# Patient Record
Sex: FEMALE
[De-identification: names, ages, dates, MRNs, and addresses within clinical notes are randomized per-mention and may not be internally consistent; named-entity substitution may affect disease eponyms.]

---
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
## 2025-01-24 ENCOUNTER — OFFICE VISIT (OUTPATIENT)
Dept: FAMILY MEDICINE CLINIC | Facility: CLINIC | Age: 20
End: 2025-01-24
Payer: MEDICAID

## 2025-01-24 VITALS
BODY MASS INDEX: 17.97 KG/M2 | WEIGHT: 95.19 LBS | DIASTOLIC BLOOD PRESSURE: 60 MMHG | SYSTOLIC BLOOD PRESSURE: 100 MMHG | HEART RATE: 61 BPM | HEIGHT: 61 IN | RESPIRATION RATE: 18 BRPM | TEMPERATURE: 97 F | OXYGEN SATURATION: 98 %

## 2025-01-24 DIAGNOSIS — E04.2 MULTIPLE THYROID NODULES: ICD-10-CM

## 2025-01-24 DIAGNOSIS — Z13.0 SCREENING FOR ENDOCRINE, NUTRITIONAL, METABOLIC AND IMMUNITY DISORDER: ICD-10-CM

## 2025-01-24 DIAGNOSIS — Z13.29 SCREENING FOR ENDOCRINE, NUTRITIONAL, METABOLIC AND IMMUNITY DISORDER: ICD-10-CM

## 2025-01-24 DIAGNOSIS — Z13.228 SCREENING FOR ENDOCRINE, NUTRITIONAL, METABOLIC AND IMMUNITY DISORDER: ICD-10-CM

## 2025-01-24 DIAGNOSIS — Z23 NEED FOR VACCINATION: ICD-10-CM

## 2025-01-24 DIAGNOSIS — Z00.00 ANNUAL PHYSICAL EXAM: Primary | ICD-10-CM

## 2025-01-24 DIAGNOSIS — R07.89 ATYPICAL CHEST PAIN: ICD-10-CM

## 2025-01-24 DIAGNOSIS — D50.0 IRON DEFICIENCY ANEMIA DUE TO CHRONIC BLOOD LOSS: ICD-10-CM

## 2025-01-24 DIAGNOSIS — K21.9 GASTROESOPHAGEAL REFLUX DISEASE WITHOUT ESOPHAGITIS: ICD-10-CM

## 2025-01-24 DIAGNOSIS — Z13.21 SCREENING FOR ENDOCRINE, NUTRITIONAL, METABOLIC AND IMMUNITY DISORDER: ICD-10-CM

## 2025-01-24 DIAGNOSIS — F41.9 ANXIETY: ICD-10-CM

## 2025-01-24 DIAGNOSIS — E01.0 THYROMEGALY: ICD-10-CM

## 2025-01-24 DIAGNOSIS — N92.6 MISSED PERIOD: ICD-10-CM

## 2025-01-24 DIAGNOSIS — R79.89 LOW TSH LEVEL: ICD-10-CM

## 2025-01-24 DIAGNOSIS — Z11.8 SCREENING FOR CHLAMYDIAL DISEASE: ICD-10-CM

## 2025-01-24 DIAGNOSIS — N94.10 DYSPAREUNIA IN FEMALE: ICD-10-CM

## 2025-01-24 PROCEDURE — 87591 N.GONORRHOEAE DNA AMP PROB: CPT | Performed by: FAMILY MEDICINE

## 2025-01-24 PROCEDURE — 87491 CHLMYD TRACH DNA AMP PROBE: CPT | Performed by: FAMILY MEDICINE

## 2025-01-24 RX ORDER — SUCRALFATE ORAL 1 G/10ML
SUSPENSION ORAL
COMMUNITY
Start: 2024-12-19 | End: 2025-01-24

## 2025-01-24 RX ORDER — SUCRALFATE 1 G/1
1 TABLET ORAL 4 TIMES DAILY
COMMUNITY
Start: 2025-01-07

## 2025-01-24 RX ORDER — OMEPRAZOLE 40 MG/1
CAPSULE, DELAYED RELEASE ORAL
COMMUNITY

## 2025-01-24 NOTE — PATIENT INSTRUCTIONS
Perform labs fasting 8 hours with water or black coffee or or black tea diet  soda only prior to exam.    -Encourage healthy diet of whole food and avoid processed food and sugary drinks and sodas.  Diet should include lean meats and vegetables including 5-7 servings of fruit and vegetables total in 1 day.  Never skip breakfast.  -Encouraged exercise 30 minutes to 60 minutes 3-5 times weekly for 150minutes or more to prevent obesity and chronic disease and eliminate stress and its effect on the body.  -encouraged to continue not smoking or vaping  - recommend condom use per CDC recommendation for all  or unmarried couples  -mammogram order given if 40years old or older  - immunizations-annual flu shot recommended  -Vitamin D3  2000 units daily recommended- buy Over-the-counter  -Recommend 1000mg of calcium daily for osteoporosis prevention discussed. Need to ingest 1000mg of calcium daily to prevent osteoporosis later in life.  I.e. one 8 ounce glass of silk [place] milk has 450 mg of calcium and label states 45%.  Labels list calcium percentages not milligrams.  To calculate milligrams per serving remove the percentage and add a zero (0).  I.e. 9% calcium equals 90 mg  -thin prep pap recommended every 3 years-If previous pap was normal  sooner as directed by your doctor.        Exercise for a Healthier Heart     Exercise with a friend. When activity is fun, you're more likely to stick with it.   You may wonder how you can improve the health of your heart. If you’re thinking about exercise, you’re on the right track. You don’t need to become an athlete, but you do need a certain amount of brisk exercise to help strengthen your heart. If you have been diagnosed with a heart condition, your doctor may recommend exercise to help stabilize your condition. To help make exercise a habit, choose safe, fun activities.  Be sure to check with your healthcare provider before starting an exercise program.   Why  exercise?  Exercising regularly offers many healthy rewards. It can help you do all of the following:  Improve your blood cholesterol level to help prevent further heart trouble  Lower your blood pressure to help prevent a stroke or heart attack  Control diabetes, or reduce your risk of getting this disease  Improve your heart and lung function  Reach and maintain a healthy weight  Make your muscles stronger and more limber so you can stay active  Prevent falls and fractures by slowing the loss of bone mass (osteoporosis)  Manage stress better  Reduce your blood pressure  Improve your sense of self and your body image  Exercise tips  Ease into your routine. Set small goals. Then build on them.  Exercise on most days. Aim for a total of 150 or more minutes of moderate to  vigorous intensity activity each week. Consider 40 minutes, 3 to 4 times a week. For best results, activity should last for 40 minutes on average. It is OK to work up to the 40 minute period over time. Examples of moderate-intensity activity is walking 1 mile in 15 minutes or 30 to 45 minutes of yard work.  Step up your daily activity level. Along with your exercise program, try being more active throughout the day. Walk instead of drive. Do more household tasks or yard work.  Choose one or more activities you enjoy. Walking is one of the easiest things you can do. You can also try swimming, riding a bike, dancing, or taking an exercise class.  Stop exercising and call your doctor if you:  Have chest pain or feel dizzy or lightheaded  Feel burning, tightness, pressure, or heaviness in your chest, neck, shoulders, back, or arms  Have unusual shortness of breath  Have increased joint or muscle pain  Have palpitations or an irregular heartbeat   Date Last Reviewed: 5/1/2016 © 2000-2018 [employer]. [address] [place] [address]. All rights reserved. This information is not intended as a substitute for professional medical  care. Always follow your healthcare professional's instructions.      Eating Heart-Healthy Foods  Eating has a big impact on your heart health. In fact, eating healthier can improve several of your heart risks at once. For instance, it helps you manage weight, cholesterol, and blood pressure. Here are ideas to help you make heart-healthy changes without giving up all the foods and flavors you love.  Getting started  Talk with your healthcare provider about eating plans, such as the DASH or Mediterranean diet. You may also be referred to a dietitian.  Change a few things at a time. Give yourself time to get used to a few eating changes before adding more.  Work to create a tasty, healthy eating plan that you can stick to for the rest of your life.    Goals for healthy eating  Below are some tips to improve your eating habits:  Limit saturated fats and trans fats. Saturated fats raise your levels of cholesterol, so keep these fats to a minimum. They are found in foods such as fatty meats, whole milk, cheese, and palm and coconut oils. Avoid trans fats because they lower good cholesterol as well as raise bad cholesterol. Trans fats are most often found in processed foods.  Reduce sodium (salt) intake. Eating too much salt may increase your blood pressure. Limit your sodium intake to 2,300 milligrams (mg) per day (the amount in 1 teaspoon of salt), or less if your healthcare provider recommends it. Dining out less often and eating fewer processed foods are two great ways to decrease the amount of salt you consume.  Managing calories. A calorie is a unit of energy. Your body burns calories for fuel, but if you eat more calories than your body burns, the extras are stored as fat. Your healthcare provider can help you create a diet plan to manage your calories. This will likely include eating healthier foods as well as exercising regularly. To help you track your progress, keep a diary to record what you eat and how often  you exercise.  Choose the right foods  Aim to make these foods staples of your diet. If you have diabetes, you may have different recommendations than what is listed here:  Fruits and vegetables provide plenty of nutrients without a lot of calories. At meals, fill half your plate with these foods. Split the other half of your plate between whole grains and lean protein.  Whole grains are high in fiber and rich in vitamins and nutrients. Good choices include whole-wheat bread, pasta, and brown rice.  Lean proteins give you nutrition with less fat. Good choices include fish, skinless chicken, and beans.  Low-fat or nonfat dairy provides nutrients without a lot of fat. Try low-fat or nonfat milk, cheese, or yogurt.  Healthy fats can be good for you in small amounts. These are unsaturated fats, such as olive oil, nuts, and fish. Try to have at least 2 servings per week of fatty fish, such as salmon, sardines, mackerel, rainbow trout, and albacore tuna. These contain omega-3 fatty acids, which are good for your heart. Flaxseed is another source of a heart-healthy fat.  More on heart-healthy eating  Read food labels  Healthy eating starts at the grocery store. Be sure to pay attention to food labels on packaged foods. Look for products that are high in fiber and protein, and low in saturated fat, cholesterol, and sodium. Avoid products that contain trans fat. And pay close attention to serving size. For instance, if you plan to eat two servings, double all the numbers on the label.  Prepare food right  A key part of healthy cooking is cutting down on added fat and salt. Look on the internet for lower-fat, lower-sodium recipes. Also, try these tips:  Remove fat from meat and skin from poultry before cooking.  Skim fat from the surface of soups and sauces.  Broil, boil, bake, steam, grill, and microwave food without added fats.  Choose ingredients that spice up your food without adding calories, fat, or sodium. Try these  items: horseradish, hot sauce, lemon, mustard, nonfat salad dressings, and vinegar. For salt-free herbs and spices, try basil, cilantro, cinnamon, pepper, and rosemary.  Date Last Reviewed: 10/1/2017  © 7119-6768 [employer]. [address] [place] [address]. All rights reserved. This information is not intended as a substitute for professional medical care. Always follow your healthcare professional's instructions.       What Is Osteoporosis?  Osteoporosis is a disease that weakens the bones. Weakened bones are more likely to break (fracture). Osteoporosis affects both men and women. But postmenopausal women are most at risk. To help prevent osteoporosis, you need to exercise and nourish your bones throughout your life.    Childhood  The body builds the most bone during these years. That's why boys and girls need foods rich in calcium. They also need plenty of exercise. A healthy diet and exercise helps bones grow strong.  Young adulthood to age 30  During young adulthood, bones become their strongest. This is called peak bone mass. The same good habits that kept bones healthy in childhood help keep bones healthy in adulthood.  Age 30 to menopause  Bone mass declines slightly during these years. Your body makes just enough new bone to maintain peak bone mass. To keep your bones at their peak mass, be sure to exercise and get plenty of calcium.  After menopause  Menopause is when a woman stops having monthly periods. After menopause, the body makes less estrogen (female hormone). This increases bone loss. At this point, treatment may be needed to reduce the risk for fracture. Exercise and calcium can also help keep your bones strong.  Later in life  In later years, both men and women need to take extra care of their bones. By this point, the body loses more bone than it makes. If too much bone is lost, you may be at increased risk for fractures. With age, the quality and quantity of bone  declines. You can lessen bone loss by staying active and increasing your calcium intake. Calcium supplements and other osteoporosis treatments do have risks. So talk with your healthcare provider if you have concerns. If you have osteoporosis, you can also learn ways to increase everyday safety.  Date Last Reviewed: 5/1/2018  © 0270-2135 [employer]. [address] [place] [address], [place] [address]. All rights reserved. This information is not intended as a substitute for professional medical care. Always follow your healthcare professional's instructions.          Preventing Osteoporosis: Meeting Your Calcium Needs    Your body needs calcium to build and repair bones. But it can't make calcium on its own. That's why it's important to eat calcium-rich foods. Some foods are naturally rich in calcium. Others have calcium added (fortified). It's best to get calcium from the foods you eat. But if you can't get enough, you may want to take calcium supplements. To meet your daily calcium needs, try the foods listed below.               Dairy Fish & beans Other sources   Source   Calcium (mg) per serving   Source   Calcium (mg) per serving   Source   Calcium (mg) per serving   Low-fat yogurt, plain   415 mg/8 oz.   Sardines, Atlantic, canned, with bones   351 mg/3 oz.   Oatmeal, instant, fortified   215 mg/1 cup   Nonfat milk   302 mg/1 cup   Ellis, sockeye, canned, with bones   239 mg/3 oz.   Tofu made with calcium sulfate   204 mg/3 oz.   Low-fat milk   297 mg/1 cup   Soybeans, fresh, boiled   131 mg/1/2 cup   Collards   179 mg/1/2 cup   Swiss cheese   272 mg/1 oz.   White beans, cooked   81 mg/1/2 cup   English muffin, whole wheat   175 mg/1 muffin   Cheddar cheese   205 mg/1 oz.   Navy beans, cooked   79 mg/1/2 cup   Kale   90 mg/1/2 cup   Ice cream strawberry   79 mg/1/2 cup           Orange, navel   56 mg/1 medium   Note: Calcium levels may vary depending on brand and size.  Daily calcium needs  14 to 18  years old: 1,300 mg  19 to 30 years old: 1,000 mg  31 to 50 years old: 1,000 mg  51 to 70 years old, women: 1,200 mg  51 to 70 years old, men: 1,000 mg  Pregnant or nursin to 18 years old: 1,300 mg, 19 to 50 years old: 1,000 mg  Older than 70 (women and men): 1,200 mg   Date Last Reviewed: 2018-2018 [employer] [address] [place] [address]. All rights reserved. This information is not intended as a substitute for professional medical care. Always follow your healthcare professional's instructions.          Vitamin D  Does this test have other names?  25-hydroxyvitamin D (25-high-DROX-ee-VIE-tuh-min D), 25(OH)D  What is this test?  Vitamin D is mainly found in fortified dairy foods, juice, breakfast cereal, and certain fish. This vitamin plays many roles in the body. But because it helps the body absorb calcium from foods and supplements, it's particularly important for bone health. Vitamin D has many additional roles in the body.  Vitamin D comes in several forms. When ultraviolet light, such as sunlight, hits your skin, it creates vitamin D3. D2 is used to fortify dairy foods. Both of these are further processed by your liver and kidneys into a form your body can use. Most tests for vitamin D check the level of a form circulating in the body called 25-hydroxyvitamin D, also called 25(OH)D.   Why do I need this test?  You may need this test if your healthcare provider wants to check your vitamin D levels to find out if you have any risks to bone health. These might be:  Low calcium  Soft bones caused by low vitamin D or problems using it (osteomalacia)  Osteopenia  Osteoporosis  Rickets, in children  You may also need this test if you are at risk for low vitamin D levels. Risks include:  Being an older adult  Having difficulty absorbing fat from your diet  Having chronic kidney disease  Have dark skin pigmentation  Being a  baby  Vitamin D has many effects in the  body. You may need this test to help your healthcare provider diagnose or treat:  Problems with the parathyroid gland  Cancer  Autoimmune diseases, such as multiple sclerosis and Crohn's disease  Psoriasis  Asthma  Weakness or falls    What other tests might I have along with this test?  A healthcare provider may also want to check your parathyroid hormone levels and your calcium levels.   What do my test results mean?  Test results may vary depending on your age, gender, health history, the method used for the test, and other things. Your test results may not mean you have a problem. Ask your healthcare provider what your test results mean for you.   Children and adults need more than 30 nanograms per milliliter (ng/ml) of vitamin D. The optimal level of 25(OH)D is usually between 30 and 60 ng/mL. Recommended daily amounts range from 400 to 800 international units (IU) per day based on your age.  Levels lower than normal can mean you are:  Not making enough vitamin D on your own  Not getting enough vitamin D in your diet  Not absorbing vitamin D from your food as you should  Lower levels may also mean that your body is not converting the vitamin as it should. This might be because of kidney or liver disease.  Above-normal levels may be a sign that you're taking too much in supplement form.   How is this test done?  The test is done with a blood sample. A needle is used to draw blood from a vein in your arm or hand.   Does this test pose any risks?  Having a blood test with a needle carries some risks. These include bleeding, infection, bruising, and feeling lightheaded. When the needle pricks your arm or hand, you may feel a slight sting or pain. Afterward, the site may be sore.   What might affect my test results?  The amount of time you spend in the sunlight, your diet, and whether you take vitamin D in supplement form can affect your vitamin D levels. Ask your healthcare provider if any health conditions you  have or medicines you take could affect your results.  How do I get ready for this test?  Tell your healthcare provider if you take vitamin D supplements. Be sure your healthcare provider knows about all medicines, herbs, vitamins, and supplements you are taking. This includes medicines that don't need a prescription and any illicit drugs you may use.   © 7627-7068 [employer]. [address] [place] [address]. All rights reserved. This information is not intended as a substitute for professional medical care. Always follow your healthcare professional's instructions.          Preventing Osteoporosis: Avoiding Bone Loss  Certain factors can speed up bone loss or decrease bone growth. For example, alcohol, cigarettes, and certain medicines reduce bone mass. Some foods make it hard for your body to absorb calcium.    Things to avoid  Here are things to avoid to help prevent osteoporosis:  Alcohol. This is toxic to bones. It is a major cause of bone loss. Heavy drinking can cause osteoporosis even if you have no other risk factors.  Smoking. This reduces bone mass. Smoking may also interfere with estrogen levels and cause early menopause.  Inactivity. Not being active makes your bones lose strength and become thinner. Over time, thin bones may break. Women who aren't active are at a high risk for osteoporosis.  Certain medicines. Some medicines, such as cortisone, increase bone loss. They also decrease bone growth. Ask your healthcare provider about any side effects of your medicines, and how to prevent them.  Protein-rich or salty foods. Eaten in large amounts, these foods may deplete calcium.  Caffeine. This increases calcium loss. People who drink a lot of coffee, tea, or soda lose more calcium than those who don't.  Date Last Reviewed: 5/1/2018  © 7725-0481 [employer]. [address] [place] [address]. All rights reserved. This information is not intended as a  substitute for professional medical care. Always follow your healthcare professional's instructions.          Living with Osteoporosis: Regular Exercise  If you have osteoporosis, exercise is vital for your health. It can prevent bone fractures and spine changes. It will slow bone loss. Exercise will strengthen your body. It can also be fun. A variety of exercises is best. See below for exercises that can help you. But before you start, talk with your healthcare provider to be sure these exercises are right for you.    Resistance exercises. These build muscle strength and maintain bone mass. They also make you less prone to injury. Exercises include lifting small weights, doing push-ups and sit-ups, using elastic exercise bands, and using weight machines.  Weight-bearing activities. These help your whole body. They also help you maintain bone mass. Activities include walking, dancing, and housework.  Non-weight-bearing exercises. These help prevent back strain and pain. They do this by building the trunk and leg muscles. Exercises that help with flexibility can prevent falls. Examples include swimming, water exercise, and stretching.  Staying safe  Here are tips to stay safe:   Always check with your healthcare provider before starting any new exercise program.  Use weights only as instructed.  Stop any exercise that causes pain.   Date Last Reviewed: 5/1/2018  © 3696-6487 [employer]. [address] [place] [address], [place] [address]. All rights reserved. This information is not intended as a substitute for professional medical care. Always follow your healthcare professional's instructions.

## 2025-01-24 NOTE — PROGRESS NOTES
REASON FOR VISIT:    Sandy Hare is a 19 year old female who presents for an Annual Health Assessment.      History of low TSH-repeat TSH was normal negative Hashimoto's or Graves' antibodies.  Ultrasound revealed multinodular thyroid.  Patient has no difficulty swallowing.  Did not follow-up with endocrinologist uncertain if still needs to be seen but would like to have thyroid evaluated.  Denies mood changes but feeling anxious and stressed.  Not interested in psychotherapy or medications.  Denies feeling depressed, sad, hopeless, SI, HI.  Missed 1 menstrual period last month.  Took a home pregnancy test that was negative then had her period 2 weeks ago.  States it was normal.  Has not seen boyfriend in a couple of weeks.      Anxiety-when feeling anxious or stressed can have tightness on the right and left side of her chest that appears to feel better with massage and eventually goes away sometimes can last a couple of hours.  Denies any associated symptoms such as shortness of breath palpitations, dizziness, wheezing, dyspnea on exertion, leg swelling.  Noticed recently intercourse has become painful.  Will have pain in the suprapubic area with penetration.  Denies any vaginal discharge or odor.  Was previously concerned about pregnancy was taking Plan B several times a month and states she has stopped.  Boyfriend is using condoms.    History of anemia-not taking iron regularly.  Can have constipation as a side effect of the anemia.  Will take iron on days she does not consume meat or on her menses to help maintain iron stores.  Denying fatigue.    History of GERD and epigastric pain is due for upper endoscopy with GI in 2 weeks and very anxious about procedure.  Worried about anesthesia.  Has never had surgery.  Denying any melena.      Single, hx of sexually active has long-distance boyfriend  G0  menses: regular, monthly- 5-7days. Did not tolerate the OCP  Birth control: condoms  Last pap: n/a  History  of abnormal pap: n/a  On vit D daily - no   MVI-no  Calcium- cheese, yogurt  Colonoscopy- no  Mammogram- no  Dexa  Exercise - walks dog, and during gym  Diet- standard   Dentist regularly- yes  Annual eye exam  Etoh: 0 drinks per month  Cigs: never, no vaping or no marijuana  Immunizations: needs covid 19 booster  FH significant: reviewed  Family History   Problem Relation Age of Onset    No Known Problems Mother     No Known Problems Father     No Known Problems Sister     Other (autism) Brother     High Cholesterol Maternal Grandmother     Hypertension Maternal Grandfather     Heart Attack Maternal Grandfather 65    Other (lung cancer age 57, smoker) Paternal Grandmother     Cancer Paternal Grandfather         esophageal    Diabetes Paternal Grandfather          Patient Active Problem List   Diagnosis    Episodic tension-type headache, not intractable    Dysmenorrhea in adolescent    Syncope    Situational anxiety    Reading impairment    OCP (oral contraceptive pills) initiation    Acne vulgaris    Dizziness    Seizure-like activity (HCC)    Iron deficiency anemia due to chronic blood loss    On Accutane therapy    Gastritis    History of PID    CANDIDO (generalized anxiety disorder)    Chronic idiopathic constipation    Leukopenia    Anemia    Low TSH level    Subclinical hyperthyroidism    Multiple thyroid nodules    Late menses     Current Outpatient Medications   Medication Sig Dispense Refill    Omeprazole 40 MG Oral Capsule Delayed Release TAKE 1 CAPSULE BY MOUTH DAILY 30 MINUTES BEFORE FOOD OR OTHER MEDS      sucralfate 1 g Oral Tab Take 1 tablet (1 g total) by mouth 4 (four) times daily. BEFORE MEALS & BEDTIME      fluticasone propionate 50 MCG/ACT Nasal Suspension 2 sprays by Each Nare route daily. 16 g 0    Ferrous Sulfate Dried ER (SLOW IRON) 160 (50 Fe) MG Oral Tab CR Take 160 mg by mouth daily as needed. Take 1 tab by mouth on days do not consume meat or during menses 90 tablet 2     Wt Readings  from Last 6 Encounters:   01/24/25 95 lb 3.2 oz (43.2 kg) (1%, Z= -2.25)*   12/30/24 92 lb 9.6 oz (42 kg) (<1%, Z= -2.53)*   10/22/24 103 lb 6.4 oz (46.9 kg) (7%, Z= -1.48)*   07/25/24 96 lb 6.4 oz (43.7 kg) (2%, Z= -2.10)*   05/07/24 96 lb 12.8 oz (43.9 kg) (2%, Z= -2.03)*   04/16/24 95 lb 9.6 oz (43.4 kg) (2%, Z= -2.15)*     * Growth percentiles are based on CDC (Girls, 2-20 Years) data.     Body mass index is 17.99 kg/m².    No results found for: \"GLUCOSE\"  Lab Results   Component Value Date    CHOLEST 131 12/12/2020    CHOLEST 144 09/19/2020    CHOLEST 132 08/22/2020     Lab Results   Component Value Date    HDL 37 (L) 12/12/2020    HDL 31 (L) 09/19/2020    HDL 32 (L) 08/22/2020     No results found for: \"TRIGLY\"  Lab Results   Component Value Date    LDL 75 12/12/2020    LDL 91 09/19/2020    LDL 78 08/22/2020     Lab Results   Component Value Date    AST 17 04/16/2024    AST 14 09/05/2023    AST 10 (L) 12/12/2020     Lab Results   Component Value Date    ALT 9 04/16/2024    ALT 8 09/05/2023    ALT 13 12/12/2020     Lab Results   Component Value Date    TSH 0.995 12/23/2019     Lab Results   Component Value Date    BUN 15 04/16/2024    BUN 13 09/05/2023    BUN 14 12/12/2020    CREATSERUM 0.72 04/16/2024    CREATSERUM 0.74 09/05/2023    CREATSERUM 0.61 12/12/2020       General Health     How would you describe your current health state?: Fair    Type of Diet: Balanced    How do you maintain positive mental well-being?: Visiting Friends;Visiting Family    How would you describe your daily physical activity?: None    If you are a male age 45-79 or a female age 55-79, do you take aspirin?: No    Have you had any immunizations at another office such as Influenza, Hepatitis B, Tetanus, or Pneumococcal?: No    At any time do you feel concerned for the safety/well-being of yourself and/or your children, in your home or elsewhere?: No     CAGE:     Cut: Have you ever felt you should Cut down on your drinking?:  No    Annoyed: Have people Annoyed you by criticizing your drinking?: No    Guilty: Have you ever felt bad or Guilty about your drinking?: No    Eye Opener: Have you ever had a drink first thing in the morning to steady your nerves or to get rid of a hangover (Eye opener)?: No    Scoring  Total Score: 0     Depression Screening (PHQ-2/PHQ-9): Over the LAST 2 WEEKS   Little interest or pleasure in doing things (over the last two weeks)?: Not at all    Feeling down, depressed, or hopeless (over the last two weeks)?: Not at all    PHQ-2 SCORE: 0        PREVENTATIVE SERVICES  INDICATIONS AND SCHEDULE Recommendation Internal Lab or Procedure External Lab or Procedure   Breast Cancer Screening   Every 2 yrs age 50-74 No recommendations at this time    Pap Every 3 yrs age 21-65 or Pap and HPV every 5 yrs age 30-65 No recommendations at this time    Chlamydia Screening Screen Annually age<25, if sex active/on OCPs; >24 high risk CHLAMYDIA TRACHOMATIS$RNA, TMA (no units)   Date Value   04/16/2024 NOT DETECTED       Colonoscopy Screen Every 10 years No recommendations at this time    Flex Sigmoidoscopy Screen  Every 5 years No results found for this or any previous visit.    Fecal Occult Blood  Annually No results found for: \"FOB\", \"OCCULTSTOOL\"    Obesity Screening Screen all adults annually Body mass index is 17.99 kg/m².      Preventive Services for Which Recommendations Vary with Risk Recommendation Internal Lab or Procedure External Lab or Procedure   Cholesterol Screening Recommended screening varies with age, risk and gender LDL Cholesterol (mg/dL)   Date Value   12/12/2020 75       Diabetes Screening  if history of high blood pressure or other  risk factors No results found for: \"A1C\"  GLUCOSE (mg/dL)   Date Value   04/16/2024 73         Gonorrhea Screening if high risk No results found for: \"GONOCOCCUS\"    HIV Screening For all adults age 18-65, older adults at increased risk HIV Antigen Antibody Combo (no units)    Date Value   09/05/2023 NON-REACTIVE       Syphilis Screening Screen if pregnant or high risk No results found for: \"RPR\"    Hepatitis C Screening Screen those at high risk plus screen one time for adults born 1945-1 965 No results found for: \"HCVAB\"    Tuberculosis Screen if high risk No components found for: \"PPDINDURAT\"      Disease Monitoring:    SPECIFIC DISEASE MONITORING Internal Lab or Procedure External Lab or Procedure   Annual Monitoring of Persistent     Medications (ACE/ARB, digoxin, diuretics)    Potassium  Annually POTASSIUM (mmol/L)   Date Value   04/16/2024 4.5         No data to display                Creatinine  Annually CREATININE (mg/dL)   Date Value   04/16/2024 0.72         No data to display                Digoxin Serum Conc  Annually No results found for: \"DIGOXIN\"      No data to display                Diabetes      HgbA1C  Annually No results found for: \"A1C\"      No data to display                Creat/alb ratio  Annually CREATININE (mg/dL)   Date Value   04/16/2024 0.72        LDL  Annually LDL Cholesterol (mg/dL)   Date Value   12/12/2020 75         No data to display                 Dilated Eye exam  Annually      No data to display                   No data to display                Asthma  (Annually between Nov. 1 & Dec. 31)    Date of last AAP/ACT and counseling given on importance of controller meds.                   ALLERGIES:     Allergies   Allergen Reactions    Doxycycline OTHER (SEE COMMENTS)     patient does not was this medication do to expressing headaches and abdomen pain    Metronidazole OTHER (SEE COMMENTS)     patient does not was this medication do to expressing headaches and abdomen pain    Penicillin G RASH       CURRENT MEDICATIONS:   Current Outpatient Medications   Medication Sig Dispense Refill    Omeprazole 40 MG Oral Capsule Delayed Release TAKE 1 CAPSULE BY MOUTH DAILY 30 MINUTES BEFORE FOOD OR OTHER MEDS      sucralfate 1 g Oral Tab Take 1 tablet (1 g  total) by mouth 4 (four) times daily. BEFORE MEALS & BEDTIME      fluticasone propionate 50 MCG/ACT Nasal Suspension 2 sprays by Each Nare route daily. 16 g 0    Ferrous Sulfate Dried ER (SLOW IRON) 160 (50 Fe) MG Oral Tab CR Take 160 mg by mouth daily as needed. Take 1 tab by mouth on days do not consume meat or during menses 90 tablet 2      MEDICAL INFORMATION:   No past medical history on file.   No past surgical history on file.   Family History   Problem Relation Age of Onset    No Known Problems Mother     No Known Problems Father     No Known Problems Sister     Other (autism) Brother     High Cholesterol Maternal Grandmother     Hypertension Maternal Grandfather     Heart Attack Maternal Grandfather 65    Other (lung cancer age 57, smoker) Paternal Grandmother     Cancer Paternal Grandfather         esophageal    Diabetes Paternal Grandfather       SOCIAL HISTORY:   Social History     Socioeconomic History    Marital status: Single   Tobacco Use    Smoking status: Never     Passive exposure: Never    Smokeless tobacco: Never   Vaping Use    Vaping status: Never Used   Substance and Sexual Activity    Alcohol use: Never    Drug use: Never   Other Topics Concern    Caffeine Concern No    Exercise No    Seat Belt No    Special Diet No    Stress Concern No    Weight Concern No     Social Drivers of Health     Food Insecurity: No Food Insecurity (1/24/2025)    NCSS - Food Insecurity     Worried About Running Out of Food in the Last Year: No     Ran Out of Food in the Last Year: No   Transportation Needs: No Transportation Needs (1/24/2025)    NCSS - Transportation     Lack of Transportation: No   Housing Stability: Not At Risk (1/24/2025)    NCSS - Housing/Utilities     Has Housing: Yes     Worried About Losing Housing: No     Unable to Get Utilities: No     Occ:     : single        REVIEW OF SYSTEMS:   GENERAL: feels well otherwise  SKIN: denies any unusual skin lesions  EYES: denies blurred vision or  double vision  HEENT: denies nasal congestion, sinus pain or ST  LUNGS: denies shortness of breath with exertion  CARDIOVASCULAR: denies chest pain on exertion  GI: hx of epigastric pain,  heartburn/GERD  : denies dysuria, vaginal discharge or itching, periods regular   MUSCULOSKELETAL: denies back pain  NEURO: denies headaches  PSYCHE: denies depression. hx of  anxiety  HEMATOLOGIC: hx of anemia  ENDOCRINE: thyroid history-nodules  ALL/ASTHMA: denies hx of allergy or asthma    EXAM:   /60   Pulse 61   Temp 97.4 °F (36.3 °C) (Temporal)   Resp 18   Ht 5' 1\" (1.549 m)   Wt 95 lb 3.2 oz (43.2 kg)   LMP 12/02/2024 (Exact Date)   SpO2 98%   BMI 17.99 kg/m²    Patient's last menstrual period was 12/02/2024 (exact date).   GENERAL: well developed, well nourished, in no apparent distress  SKIN: no rashes, no suspicious lesions  HEENT: atraumatic, normocephalic, ears and throat are clear  EYES:PERRLA, EOMI, normal optic disk, conjunctiva are clear  NECK: supple, no adenopathy, no bruits  CHEST: no chest tenderness  BREAST: deferred  LUNGS: clear to auscultation  CARDIO: RRR without murmur  GI: good BS's, no masses, HSM or tenderness  :  declined  RECTAL: deferred  MUSCULOSKELETAL: back is not tender, FROM of the back  EXTREMITIES: no cyanosis, clubbing or edema  NEURO: Oriented times three, cranial nerves are intact, motor and sensory are grossly intact    ASSESSMENT AND OTHER RELEVANT CHRONIC CONDITIONS:   Sandy Hare is a 19 year old female who presents for an Annual Health Assessment.     PLAN SUMMARY:   1. Annual physical exam  -Encourage Mediterranean diet  -Recommended exercise-  60 minutes daily to prevent obesity and chronic disease and eliminate stress and its effect on the body until age 21.  -encouraged not to continue not smoking or vaping.  No established safety data or long-term effects of marijuana use.  -Osteoporosis prevention-recommend ingesting 1000milligrams daily ingest dairy rich  foods  -safe sex practices-- recommend condom use all couples to help prevent sexually transmitted diseases  -Flu shot recommended annually in fall  -recommended cervical cancer screening/Pap smear starting at age 21  -CBC With Differential With Platelet  - Tetanus-Diphth-Acell Pertussis, Tdap, 5-2.5-18.5 LF-MCG/0.5 Intramuscular Suspension; Inject 0.5 mL into the muscle once for 1 dose. Local pharmacy  Dispense: 0.5 mL; Refill: 0    2. Multiple thyroid nodules  3. Thyromegaly  - TSH W REFLEX TO FREE T4 [37363][Q]  - US THYROID (CPT=76536); Future  No palpable thyroid nodules-mildly enlarged asymptomatic otherwise  No change in voice    4. Low TSH level-history of  - TSH W REFLEX TO FREE T4 [17384][Q]    5. Iron deficiency anemia due to chronic blood loss  - IRON AND TIBC [8328] [Q]  - FERRITIN [457] [Q]  Encouraged iron on the days she does not eat meat and during her menstrual cycle.  May use MiraLAX and fiber Gummies to help manage constipation symptoms  Improve water intake drink 60 to 80 ounces daily  Increase fiber in diet    6. Missed period  - Urine Preg Test- negative    7. Anxiety  - TSH W REFLEX TO FREE T4 [22311][Q]  - Angel Trejo Navigator  Refuses medication  Agrees to psychotherapy and to learn coping skills    8. Atypical chest pain  - TSH W REFLEX TO FREE T4 [93461][Q]  only occurs with when feeling very anxious no other associated symptoms  Seems to improve relaxes or massages both sides of her chest.  No associated palpitations.    9. Dyspareunia in female  - OBG Referral - [facility] ([place])  Denied any abnormal vaginal discharge or odor.  Using condoms.  Will check GC and chlamydia   refuses OCPs  History of dysmenorrhea possible may have endometriosis    10. Gastroesophageal reflux disease without esophagitis  Reassured patient is EGDs are fairly safe with anesthesia only down through IV and will not be ended intubated.  In general EGD procedure itself can take anywhere from 3 to 10 minutes.   It is not a long procedure  Encourage patient to follow-up with GI since not responding to GERD medications.  No melena, dysphagia or vomiting.    11. Need for vaccination  - Tetanus-Diphth-Acell Pertussis, Tdap, 5-2.5-18.5 LF-MCG/0.5 Intramuscular Suspension; Inject 0.5 mL into the muscle once for 1 dose. Local pharmacy  Dispense: 0.5 mL; Refill: 0    13. Screening for endocrine, nutritional, metabolic and immunity disorder  - CBC With Differential With Platelet    14. Screening for chlamydial disease  - Chlamydia/Gc Amplification      The patient indicates understanding of these issues and agrees to the plan.  Return in about 1 year (around 1/24/2026) for annual physical, sooner if needed., if worse or new symptoms.    Diet counseling perfomed  Exercise counseling perfomed  STI Prevention counseling perfomed    SUGGESTED VACCINATIONS - Influenza, Pneumococcal, Zoster, Tetanus     Immunization History   Administered Date(s) Administered    Covid-19 Vaccine Pfizer 30 mcg/0.3 ml 06/12/2021, 07/10/2021    Covid-19 Vaccine Pfizer Mark-Sucrose 30 mcg/0.3 ml 02/25/2022    DTAP 11/16/2005, 11/16/2005, 01/20/2006, 01/20/2006, 04/25/2006, 04/25/2006, 03/12/2007, 03/12/2007, 08/15/2011, 08/15/2011    FLULAVAL 6 months & older 0.5 ml Prefilled syringe (59157) 10/22/2018, 01/20/2020, 11/16/2020, 11/04/2021, 11/08/2022, 09/12/2023    FLUZONE 6 months and older PFS 0.5 ml (78764) 10/22/2018, 01/20/2020, 11/16/2020, 11/04/2021    Fluvirin, 3 Years & >, Im 12/01/2006, 12/15/2008    HEP A,Ped/Adol,(2 Dose) 03/12/2007, 11/26/2007    HEP B, Adult 09/12/2023    HEP B, Ped/Adol 09/12/2005, 10/18/2005, 09/01/2006    HEP B/HIB 09/12/2006    HIB 11/16/2005, 01/20/2006, 09/01/2006    Hib, Unspecified Formulation 11/16/2005, 01/20/2006    Hpv Virus Vaccine 9 María Elena Im 05/11/2018, 11/23/2018    IPV 11/16/2005, 01/20/2006, 04/25/2006, 08/15/2011    Influenza Vaccine, trivalent (IIV3), PF 0.5mL (60923) 10/08/2024    Intranasal (CPT=90660),  Influenza Vaccine, Flu Clinic 10/30/2012    MMR 09/12/2006, 08/15/2011    Meningococcal-Menactra 06/07/2017    Meningococcal-Menveo 10-55 YEARS 11/04/2021    Meningococcal-Menveo 2month-55yr 11/04/2021    Pneumococcal (Prevnar 7) 09/12/2005, 11/16/2005, 01/20/2006, 04/25/2006, 09/12/2006    TDAP 12/25/2015    Varicella 09/12/2006, 08/15/2011       Influenza Annually   Pneumococcal if high risk   Td/Tdap once then every 10 years   HPV Females 11-26: 3 doses   Zoster (Shingles) 60 and older: one dose   Varicella 2 doses if not immune   MMR 1-2 doses if born after 1956 and not immune

## 2025-01-26 PROBLEM — Z30.011 OCP (ORAL CONTRACEPTIVE PILLS) INITIATION: Status: RESOLVED | Noted: 2018-06-22 | Resolved: 2025-01-26

## 2025-01-26 PROBLEM — L70.0 ACNE VULGARIS: Status: RESOLVED | Noted: 2019-05-03 | Resolved: 2025-01-26

## 2025-01-26 PROBLEM — R55 SYNCOPE: Status: RESOLVED | Noted: 2018-05-11 | Resolved: 2025-01-26

## 2025-01-26 PROBLEM — D72.819 LEUKOPENIA: Status: RESOLVED | Noted: 2024-04-23 | Resolved: 2025-01-26

## 2025-01-26 PROBLEM — Z79.899 ON ACCUTANE THERAPY: Status: RESOLVED | Noted: 2020-11-19 | Resolved: 2025-01-26

## 2025-01-26 PROBLEM — R56.9 SEIZURE-LIKE ACTIVITY (HCC): Status: RESOLVED | Noted: 2019-12-17 | Resolved: 2025-01-26

## 2025-01-26 PROBLEM — R42 DIZZINESS: Status: RESOLVED | Noted: 2019-12-17 | Resolved: 2025-01-26

## 2025-01-26 PROBLEM — Z79.2 ON ACCUTANE THERAPY: Status: RESOLVED | Noted: 2020-11-19 | Resolved: 2025-01-26

## 2025-01-26 PROBLEM — G44.219 EPISODIC TENSION-TYPE HEADACHE, NOT INTRACTABLE: Status: RESOLVED | Noted: 2018-05-11 | Resolved: 2025-01-26

## 2025-01-26 PROBLEM — D64.9 ANEMIA: Status: RESOLVED | Noted: 2024-04-23 | Resolved: 2025-01-26

## 2025-01-27 ENCOUNTER — TELEPHONE (OUTPATIENT)
Age: 20
End: 2025-01-27

## 2025-01-27 LAB
C TRACH DNA SPEC QL NAA+PROBE: NEGATIVE
N GONORRHOEA DNA SPEC QL NAA+PROBE: NEGATIVE

## 2025-01-27 NOTE — TELEPHONE ENCOUNTER
[facility]  [address], [address], [place], [address]  Phone: [phone]  [email or web address]     [facility]  [address], [place], [address]  Phone: [phone]  [email or web address]    [facility]  [address], [address], [place], [address]  Phone: [phone]  [email or web address]    [facility]  [address], [address], [place], [address]  Phone: [phone]  [email or web address]    [facility]  [address], [address], [place], [address]  Phone: [phone]  [email or web address]    [facility]  [address] , [address], [place], [address]  Phone: [phone]  [email or web address]

## 2025-01-30 LAB
% SATURATION: 12 % (CALC) (ref 15–45)
ABSOLUTE BASOPHILS: 30 CELLS/UL (ref 0–200)
ABSOLUTE EOSINOPHILS: 200 CELLS/UL (ref 15–500)
ABSOLUTE LYMPHOCYTES: 1684 CELLS/UL (ref 850–3900)
ABSOLUTE MONOCYTES: 303 CELLS/UL (ref 200–950)
ABSOLUTE NEUTROPHILS: 1484 CELLS/UL (ref 1500–7800)
BASOPHILS: 0.8 %
EOSINOPHILS: 5.4 %
FERRITIN: 7 NG/ML (ref 16–154)
HEMATOCRIT: 31.5 % (ref 35–45)
HEMOGLOBIN: 11 G/DL (ref 11.7–15.5)
IRON BINDING CAPACITY: 319 MCG/DL (CALC) (ref 271–448)
IRON, TOTAL: 39 MCG/DL (ref 27–164)
LYMPHOCYTES: 45.5 %
MCH: 33.2 PG (ref 27–33)
MCHC: 34.9 G/DL (ref 32–36)
MCV: 95.2 FL (ref 80–100)
MONOCYTES: 8.2 %
MPV: 13.9 FL (ref 7.5–12.5)
NEUTROPHILS: 40.1 %
PLATELET COUNT: 141 THOUSAND/UL (ref 140–400)
RDW: 14.2 % (ref 11–15)
RED BLOOD CELL COUNT: 3.31 MILLION/UL (ref 3.8–5.1)
T4, FREE: 1 NG/DL (ref 0.8–1.4)
TSH W/REFLEX TO FT4: 0.4 MIU/L
WHITE BLOOD CELL COUNT: 3.7 THOUSAND/UL (ref 3.8–10.8)

## 2025-02-06 ENCOUNTER — TELEMEDICINE (OUTPATIENT)
Dept: FAMILY MEDICINE CLINIC | Facility: CLINIC | Age: 20
End: 2025-02-06
Payer: MEDICAID

## 2025-02-06 DIAGNOSIS — R79.89 LOW TSH LEVEL: Primary | ICD-10-CM

## 2025-02-06 DIAGNOSIS — E05.90 SUBCLINICAL HYPERTHYROIDISM: ICD-10-CM

## 2025-02-06 DIAGNOSIS — N91.2 AMENORRHEA: ICD-10-CM

## 2025-02-06 DIAGNOSIS — F41.1 GAD (GENERALIZED ANXIETY DISORDER): ICD-10-CM

## 2025-02-06 DIAGNOSIS — E04.2 MULTIPLE THYROID NODULES: ICD-10-CM

## 2025-02-06 DIAGNOSIS — D50.0 IRON DEFICIENCY ANEMIA DUE TO CHRONIC BLOOD LOSS: ICD-10-CM

## 2025-02-06 DIAGNOSIS — K59.03 DRUG-INDUCED CONSTIPATION: ICD-10-CM

## 2025-02-06 RX ORDER — ASPIRIN 81 MG
100 TABLET, DELAYED RELEASE (ENTERIC COATED) ORAL 2 TIMES DAILY PRN
Qty: 60 TABLET | Refills: 0 | Status: SHIPPED | OUTPATIENT
Start: 2025-02-06

## 2025-02-06 RX ORDER — OMEGA-3S/DHA/EPA/FISH OIL 1000-1400
2 CAPSULE,DELAYED RELEASE (ENTERIC COATED) ORAL NIGHTLY
Qty: 180 TABLET | Refills: 3 | Status: SHIPPED | OUTPATIENT
Start: 2025-02-06

## 2025-02-06 RX ORDER — DICYCLOMINE HYDROCHLORIDE 10 MG/1
CAPSULE ORAL 4 TIMES DAILY PRN
COMMUNITY
Start: 2025-02-04

## 2025-02-06 RX ORDER — CHOLECALCIFEROL (VITAMIN D3) 10(400)/ML
160 DROPS ORAL DAILY PRN
Qty: 90 TABLET | Refills: 3 | Status: SHIPPED | OUTPATIENT
Start: 2025-02-06

## 2025-02-06 NOTE — PROGRESS NOTES
The patient's office visit was converted to a video visit due to pt preference  Time Spent: 23 minutes    Chief Complaint   Patient presents with    Follow - Up     Discuss lab results      Subjective     HPI:   Sandy Hare is a 19 year old female who presents for discuss labs. Recent repeat labs- History of low TSH-repeat TSH was normal negative Hashimoto's or Graves' antibodies on 5/10/2025. Ultrasound revealed multinodular thyroid. Patient has no difficulty swallowing. Not taking any supplements. Did not follow-up with endocrinologist uncertain if still needs to be seen but would like to have thyroid evaluated. Denies mood changes but feeling anxious and stressed. Not interested in psychotherapy or medications. Denies feeling depressed, sad, hopeless, SI, HI.     Amenorrhea- -LMP 12/3/2025. Took a home pregnancy test that was negative then had her period 2 weeks ago. States it was normal. Has not seen boyfriend in a couple of weeks.  Wants to continue using condoms.  Was using Plan B excessively previously but states has not used it in several months.    LAKESHIA-not taking iron. Off iron x several months. Hx of contstipation drug induced.  Eating high-fiber diet vegetables fruit drinking a lot of water avocado.  States having bowel movements every day.  Does not have any fiber supplements or stool softeners.  Sometimes has dizziness if she goes from sitting to standing but no syncope.  Has history of syncope with moderate iron deficiency anemia in the past.  Denies any palpitations.  No chest pain except for chest burning.    GERD/chest burning-had recent EGD on 1/27/2025 with GI and told all normal.  States will be having a \"acid study\" and is on omeprazole 40 mg but does not feel helping.  Still having the burning in the chest.  States she is waiting for her callback from the GI.  She was told to call the gastroenterologist in 2 days if she was not better on the medication.  She denies any shortness of  breath, worsening dyspnea dizziness, worsening pain.  Burning in the chest symptoms started in December 2024.        Weight 97# now  Wt Readings from Last 6 Encounters:   01/24/25 95 lb 3.2 oz (43.2 kg) (1%, Z= -2.25)*   12/30/24 92 lb 9.6 oz (42 kg) (<1%, Z= -2.53)*   10/22/24 103 lb 6.4 oz (46.9 kg) (7%, Z= -1.48)*   07/25/24 96 lb 6.4 oz (43.7 kg) (2%, Z= -2.10)*   05/07/24 96 lb 12.8 oz (43.9 kg) (2%, Z= -2.03)*   04/16/24 95 lb 9.6 oz (43.4 kg) (2%, Z= -2.15)*     * Growth percentiles are based on [facility] (Girls, 2-20 Years) data.          Chief Complaint Reviewed and Verified  Nursing Notes Reviewed and   Verified  Tobacco Reviewed  Allergies Reviewed  Medications Reviewed    Medical History Reviewed  Surgical History Reviewed  OB Status Reviewed    Family History Reviewed  Social History Reviewed                Medications Ordered Prior to Encounter[1]        Physical Exam:  LMP 12/02/2024 (Exact Date)     alert and cooperative, well-nourished/well-hydrated, no no pallor or tachypnea, appropriately groomed, speaking in full sentences comfortably and Normal work of breathing, answers questions appropriately      Office Visit on 01/24/2025   Component Date Value    WHITE BLOOD CELL COUNT 01/29/2025 3.7 (L)     RED BLOOD CELL COUNT 01/29/2025 3.31 (L)     HEMOGLOBIN 01/29/2025 11.0 (L)     HEMATOCRIT 01/29/2025 31.5 (L)     MCV 01/29/2025 95.2     MCH 01/29/2025 33.2 (H)     MCHC 01/29/2025 34.9     RDW 01/29/2025 14.2     PLATELET COUNT 01/29/2025 141     MPV 01/29/2025 13.9 (H)     ABSOLUTE NEUTROPHILS 01/29/2025 1,484 (L)     ABSOLUTE LYMPHOCYTES 01/29/2025 1,684     ABSOLUTE MONOCYTES 01/29/2025 303     ABSOLUTE EOSINOPHILS 01/29/2025 200     ABSOLUTE BASOPHILS 01/29/2025 30     NEUTROPHILS 01/29/2025 40.1     LYMPHOCYTES 01/29/2025 45.5     MONOCYTES 01/29/2025 8.2     EOSINOPHILS 01/29/2025 5.4     BASOPHILS 01/29/2025 0.8     Chlamydia Trachomatis Am* 01/24/2025 Negative     Neisseria Gonorrhoeae  Am* 01/24/2025 Negative     Chlam/GC Source 01/24/2025 Urine     Pregnancy Test, Urine 01/24/2025 negative     Control Line Present wit* 01/24/2025 yes     Kit Lot # 01/24/2025 [id number]     Kit Expiration Date 01/24/2025 01/06/2026     TSH W/REFLEX TO FT4 01/29/2025 0.40 (L)     T4, FREE 01/29/2025 1.0     IRON, TOTAL 01/29/2025 39     IRON BINDING CAPACITY 01/29/2025 319     % SATURATION 01/29/2025 12 (L)     FERRITIN 01/29/2025 7 (L)    Office Visit on 12/30/2024   Component Date Value    SARS-CoV-2 (COVID-19)- (* 12/30/2024 Not Detected     Influenza A by PCR 12/30/2024 Not Detected     Influenza B by PCR 12/30/2024 Not Detected     RSV by PCR 12/30/2024 Not Detected    Office Visit on 10/22/2024   Component Date Value    Strep Grp A Screen 10/22/2024 positive     Control Line Present wit* 10/22/2024 yes     Kit Lot # 10/22/2024 [id number]     Kit Expiration Date 10/22/2024 11/15/2025      tudy Result    Narrative   PROCEDURE: US THYROID (CPT= 92394)     COMPARISON: None.     INDICATIONS: E05.90 Subclinical hyperthyroidism     TECHNIQUE: High-resolution ultrasound was performed of the thyroid gland.     FINDINGS:  RIGHT LOBE:  Size:   5.3 x 1.8 x 1.1 cm,  5.1 ml.    Echotexture:   Homogeneous with 2 subcentimeter nodules in the mid pole measuring up to 0.3 cm and 0.4 cm respectively both corresponding with TR 4.  Doppler Flow:   Normal          LEFT LOBE:  Size:   4.9 x 1.1 x 1.6 cm,  4.3 ml.    Echotexture:   Homogeneous with a 0.7 cm lower pole nodule corresponding with TR 3.  Doppler Flow:   Normal          ISTHMUS:  0.3 cm AP diameter in the midline.  Normal echogenicity.  No masses.    OTHER:   No masses or adenopathy.                      Impression   CONCLUSION:     Multinodular thyroid as described above.  The bilateral subcentimeter above described thyroid nodules do not meet ACR criteria for follow-up sonography or fine-needle aspiration.        ACR THYROID IMAGING REPORTING AND DATA SYSTEM (TI-RADS)  GUIDELINES     Score Assessment   Recommendation     TR1:  0 points benign   No FNA required  TR2:  2 points not suspicious No FNA required  TR3:  3 points     mildly suspicious Greater than or equal to 1.5cm follow-up, greater than or equal to 2.5cm FNA: follow up: 1, 2, 3 and 5 years.    TR4:  4-6 points   moderately suspicious Greater than or equal to 1.0cm follow-up, greater than or equal to 1.5cm FNA, follow up: 1, 2, 3 and 5 years.    TR5: 7 or more points highly suspicious Greater than or equal to 0.5cm follow up, greater than or equal to 1.0cm FNA: annual follow up for 5 years.               Dictated by (CST): Jayme Flood MD on 5/08/2024 at 4:15 PM      Finalized by (CST): Jayme Flood MD on 5/08/2024 at 4:16 PM                 Assessment    Diagnoses and all orders for this visit:    Low TSH level  Subclinical hyperthyroidism  Multiple thyroid nodules  -     Endocrine Referral - In Network  TSH is still low.  Ultrasound with multinodular goiter x 3 with TR 3 and TR 4 qualifications but less than 1 cm so follow-up her ACR thyroid imaging guidelines no follow-up is indicated.  Recommend endocrinology consult  Patient agrees to schedule    Iron deficiency anemia due to chronic blood loss  -    Restart Ferrous Sulfate Dried ER (SLOW IRON) 160 (50 Fe) MG Oral Tab CR; Take 160 mg by mouth daily as needed. Take iron during menses and on days do not consume meat  -     Start to prevent constipation fiber Adult Gummies 2 g Oral Chew Tab; Chew 2 tablets by mouth at bedtime. Buy over-the-counter.  Refuses to take OCPs  Not interested in IUD  Patient is not vegetarian but can skip a few days a week without eating chicken or meat  Take iron during her menstrual cycle and on the days she does not consume meat  Will try not taking daily due to GI side effects and constipation    Drug-induced constipation  -     Ferrous Sulfate Dried ER (SLOW IRON) 160 (50 Fe) MG Oral Tab CR; Take 160 mg by mouth daily as  needed. Take iron during menses and on days do not consume meat  -     Fiber Adult Gummies 2 g Oral Chew Tab; Chew 2 tablets by mouth at bedtime. Buy over-the-counter.  -     docusate sodium 100 MG Oral Tab; Take 1 tablet (100 mg total) by mouth 2 (two) times daily as needed for constipation. BUY OTC if not covered- compare to \" Colace\" name brand at local pharmacy  Drink adequate amounts of water 70 ounces daily  Encouraged high-fiber diet with multiple fruits and vegetables at least 3-4 servings total daily  Limit starches such as bread, rice and [place] which are constipating    CANDIDO (generalized anxiety disorder)  Declined psychotherapy or medication  Is tends to be very anxious and worries medically about her health    Amenorrhea  Possibly due to thyroid disease reporting negative home pregnancy test and also in GI office recently  Not in office to check urine hCG-but had negative urine hCG 1/24/2025 prior to EGD  Will monitor       Consult ordered.  Prescription medication ordered.  Recommended OTC medications- over-the-counter fiber Gummies  Reinforced healthy diet, lifestyle, and exercise.    Return in about 3 months (around 5/6/2025) for discuss labs, recheck symptoms.    DO Sandy Sanchez understands video or phone evaluation is not a substitute for face-to-face examination or emergency care. Patient advised to go to ER or call 911 for worsening symptoms or acute distress.     Patient/Caregiver Education: Patient/Caregiver Education: There are no barriers to learning. Medical education done.   Outcome: Patient verbalizes understanding. Patient is notified to call with any questions, complications, allergies, or worsening or changing symptoms.  Patient is to call with any side effects or complications from the treatments as a result of today.     Please note that the following visit was completed using two-way, real-time interactive audio and/or video communication.  This has been done  in good yaima to provide continuity of care in the best interest of the provider-patient relationship, due to the on-going public health crisis/national emergency and because of restrictions of visitation.  There are limitations of this visit as no physical exam could be performed.  Every conscious effort was taken to allow for sufficient and adequate time.  This billing visit was spent on reviewing labs, medications, radiology tests and decision making.  Appropriate medical decision-making and tests are ordered as detailed in the plan of care above.          [1]   Current Outpatient Medications on File Prior to Visit   Medication Sig Dispense Refill    dicyclomine 10 MG Oral Cap Take 1-2 capsules (10-20 mg total) by mouth 4 (four) times daily as needed.      Omeprazole 40 MG Oral Capsule Delayed Release TAKE 1 CAPSULE BY MOUTH DAILY 30 MINUTES BEFORE FOOD OR OTHER MEDS      sucralfate 1 g Oral Tab Take 1 tablet (1 g total) by mouth 4 (four) times daily. BEFORE MEALS & BEDTIME      fluticasone propionate 50 MCG/ACT Nasal Suspension 2 sprays by Each Nare route daily. 16 g 0    Ferrous Sulfate Dried ER (SLOW IRON) 160 (50 Fe) MG Oral Tab CR Take 160 mg by mouth daily as needed. Take 1 tab by mouth on days do not consume meat or during menses 90 tablet 2     No current facility-administered medications on file prior to visit.

## 2025-02-11 PROBLEM — E04.2 MULTINODULAR GOITER: Status: ACTIVE | Noted: 2024-05-08

## 2025-02-11 NOTE — PATIENT INSTRUCTIONS
Follow up with Ness Alvarez MD Return in about 4 weeks (around 3/25/2025)., patient requesting virtual visit   Stop taking ashwagandha , avoid taking thyroid support   Please print lab orders for quest for the patient  Please complete thyroid blood work (does not need to be fasting):      [x] Week of   If you're taking biotin, you should stop taking it 7 days prior to your thyroid blood draw. Biotin doesn't impact your thyroid function, but it can effect the way the lab tests your blood so you should avoid taking it 7 days before.  .   Imaging/testing:   [] No further imaging/testing needed   [] Uptake and scan ordered   [x] Thyroid ultrasound ordered  [] FNA biopsy      To schedule tests:   Call Central [phone] for FNA and uptake and scan scheduling  Thyroid ultrasounds can be scheduled on the [facility].   [facility] is another affordable option in the area to complete ultrasound- you can call them directly to schedule, they will have our order in the system already.  [phone]  Blood tests can be done at any [facility] lab location, or at [facility] if that is what you requested.  Most of the labs are walk in meaning you don't need an appointment. You can schedule these on the [facility] a walk in lab to have an appointment ready.  Here are the lab locations: [email or web address]       ** For female patients ONLY ** Please call the office if you before planning pregnancy in the future and once pregnancy is confirmed        [x] Central scheduling # for ultrasound/nuclear med/CT/MRI/DXA/IR    General follow up information:  Please let us know if you require any refills at least 1 week prior to your medication running out. If you do run out of medication, please call our office ASAP to request refills (do not wait until your follow up).  Please call us if you experience any problems with insurance coverage of medication, lab work, or imaging.   Lab results  and imaging will typically be reviewed at follow up appointments, or within 3-5 business days of ALL results being in if you do not have an appointment scheduled in the near future. Our office will contact you for any abnormal results requiring more urgent follow up or action.   The on-call pager is for urgent matters only. If you are a type 1 diabetic and run out of insulin after business hours 8AM-4PM, you may call the on-call pager for a refill to a 24 hour pharmacy. If you have adrenal insufficiency and run out of steroids, you may call the on-call pager for a refill to a 24 hour pharmacy. All other refill requests should be requested during business hours.  Office phone number: [phone]  Office hours: Monday-Friday, closed on holidays  Phones open 8:30am-4:30pm

## 2025-02-11 NOTE — PROGRESS NOTES
REASON FOR CONSULTATION:    Chief Complaint   Patient presents with    Consult     Patient presents to establish care with endocrinology for thyroid concerns. Had low TSH lab result in January. Had normal thyroid US results.         PHYSICIAN REQUESTING CONSULTATION:    Linda Padilla DO      HPI:    Sandy Hare is a 19 year old female with past medical history of multinodular goiter, subclinical hyperthyroid, GERD, anxiety, iron deficiency anemia, who presents for consultation regarding hyperthyroidism.    Here with mother      Hyperthyroidism:  Diagnosis: 4/2024 noted on lab obtained by PCP, patient has had low normal TSH starting ~ 2019, FT4 has remained WNL  Presenting symptoms: hair loss, anxiety, burning of mouth, amenorrhea for 2 months, after researching her lab tests online  Anti-thyroid medications:None  Methimazole:  Beta blocker:  Compliance:NA  Labs:  TFTs:  1/29/25 labs: TSH 0.4, free T4 1.0, ANC 1.4   Lab Results   Component Value Date    TSH 0.995 12/23/2019    T4F 1.0 01/29/2025    T4F 0.9 04/16/2024    WBC 3.7 (L) 01/29/2025    WBC 3.7 (L) 04/16/2024    WBC 8.1 01/02/2024    NE 1,484 (L) 01/29/2025    NE 2,113 04/16/2024    NE 4,909 01/02/2024    AST 17 04/16/2024    AST 14 09/05/2023    AST 10 (L) 12/12/2020    ALT 9 04/16/2024    ALT 8 09/05/2023    ALT 13 12/12/2020    CHOLEST 131 12/12/2020    LDL 75 12/12/2020    TRIG 94 (H) 12/12/2020        Thyroid antibodies if any: 5/2024 TPO, TG antibodies negative    Thyroid Ultrasound performed:  US THYROID (CPT=76536)    Result Date: 5/8/2024  PROCEDURE: US THYROID (CPT= 92659)  COMPARISON: None.  INDICATIONS: E05.90 Subclinical hyperthyroidism  TECHNIQUE: High-resolution ultrasound was performed of the thyroid gland.  FINDINGS:  RIGHT LOBE:  Size:   5.3 x 1.8 x 1.1 cm,  5.1 ml.  Echotexture:   Homogeneous with 2 subcentimeter nodules in the mid pole measuring up to 0.3 cm and 0.4 cm respectively both corresponding with TR 4. Doppler Flow:    Normal    LEFT LOBE:  Size:   4.9 x 1.1 x 1.6 cm,  4.3 ml.  Echotexture:   Homogeneous with a 0.7 cm lower pole nodule corresponding with TR 3. Doppler Flow:   Normal    ISTHMUS:   0.3 cm AP diameter in the midline.  Normal echogenicity.  No masses.  OTHER:   No masses or adenopathy.             CONCLUSION:   Multinodular thyroid as described above.  The bilateral subcentimeter above described thyroid nodules do not meet ACR criteria for follow-up sonography or fine-needle aspiration.   ACR THYROID IMAGING REPORTING AND DATA SYSTEM (TI-RADS) GUIDELINES   Score Assessment   Recommendation  TR1:  0 points benign   No FNA required TR2:  2 points not suspicious No FNA required TR3:  3 points     mildly suspicious Greater than or equal to 1.5cm follow-up, greater than or equal to 2.5cm FNA: follow up: 1, 2, 3 and 5 years.  TR4:  4-6 points   moderately suspicious Greater than or equal to 1.0cm follow-up, greater than or equal to 1.5cm FNA, follow up: 1, 2, 3 and 5 years.  TR5: 7 or more points highly suspicious Greater than or equal to 0.5cm follow up, greater than or equal to 1.0cm FNA: annual follow up for 5 years.           Dictated by (CST): Jayme Flood MD on 5/08/2024 at 4:15 PM     Finalized by (CST): Jayme Flood MD on 5/08/2024 at 4:16 PM          Nuclear medicine uptake scan performed:No  Current symptoms:  Unintentional weight loss:, none, reports weight loss when she is ill and wit changes in diet   Heat intolerance/ diaphoresis: Sweaty palms, cold hands at rest from time to time    Palpitations/SOB/ chest pain:SOB + palpitations when she is anxious  Fatigue:yes  Mood: has underlying anxiety at baseline, Anxiety, insomnia  Diarrhea/ frequent bowel movements: No  Menstrual cycle: used to take plan B, hx of irregular cycles in the past, amenorrhea starting 12/2024  Skin/hair/nail changes: hair loss +  Eye symptoms if any:None  Neck swelling:No, reports burning sensation around mouth and  neck  Compressive symptoms: No Dysphagia, voice changes, SOB  Family history of thyroid disorders:None  Exposure to XRT to head and neck or ionizing radiation in childhood:no  Recent steroids,contrast exposure/amiodarone/ immune therapy/biotin/herbal supplements: Iron, MVI, ashwagandha for stress( started 2-3 weeks ago, per  tic tavia, using the franck brand)  Recent vaccines/viral symptoms: Viral URI in 12/2024   Pregnancy plans if applicable: None        ROS      Review of Systems   Constitutional:  Positive for fatigue. Negative for activity change, appetite change, diaphoresis and unexpected weight change.   HENT:  Negative for sore throat, trouble swallowing and voice change.    Eyes:  Negative for photophobia and visual disturbance.   Respiratory:  Positive for shortness of breath. Negative for cough and choking.    Cardiovascular:  Positive for palpitations. Negative for chest pain and leg swelling.   Gastrointestinal:  Positive for nausea. Negative for constipation, diarrhea and vomiting.   Endocrine: Negative for cold intolerance, heat intolerance, polydipsia, polyphagia and polyuria.   Genitourinary:  Positive for menstrual problem. Negative for frequency.   Musculoskeletal:  Negative for arthralgias, back pain and myalgias.   Skin:  Negative for rash.   Neurological:  Negative for dizziness, tremors, seizures, syncope, weakness, light-headedness, numbness and headaches.   Hematological:  Does not bruise/bleed easily.   Psychiatric/Behavioral:  Positive for sleep disturbance. Negative for decreased concentration and dysphoric mood. The patient is nervous/anxious.          Past Medical History:    On Accutane therapy         History reviewed. No pertinent surgical history.      Current Outpatient Medications   Medication Sig Dispense Refill    dicyclomine 10 MG Oral Cap Take 1-2 capsules (10-20 mg total) by mouth 4 (four) times daily as needed.      Ferrous Sulfate Dried ER (SLOW IRON) 160 (50 Fe) MG Oral Tab  CR Take 160 mg by mouth daily as needed. Take iron during menses and on days do not consume meat 90 tablet 3    Fiber Adult Gummies 2 g Oral Chew Tab Chew 2 tablets by mouth at bedtime. Buy over-the-counter. 180 tablet 3    docusate sodium 100 MG Oral Tab Take 1 tablet (100 mg total) by mouth 2 (two) times daily as needed for constipation. BUY OTC if not covered- compare to \" Colace\" name brand at local pharmacy 60 tablet 0    Omeprazole 40 MG Oral Capsule Delayed Release TAKE 1 CAPSULE BY MOUTH DAILY 30 MINUTES BEFORE FOOD OR OTHER MEDS      sucralfate 1 g Oral Tab Take 1 tablet (1 g total) by mouth 4 (four) times daily. BEFORE MEALS & BEDTIME      fluticasone propionate 50 MCG/ACT Nasal Suspension 2 sprays by Each Nare route daily. 16 g 0    Ferrous Sulfate Dried ER (SLOW IRON) 160 (50 Fe) MG Oral Tab CR Take 160 mg by mouth daily as needed. Take 1 tab by mouth on days do not consume meat or during menses 90 tablet 2         Allergies   Allergen Reactions    Doxycycline OTHER (SEE COMMENTS)     patient does not was this medication do to expressing headaches and abdomen pain    Metronidazole OTHER (SEE COMMENTS)     patient does not was this medication do to expressing headaches and abdomen pain    Penicillin G RASH         Social History     Socioeconomic History    Marital status: Single   Tobacco Use    Smoking status: Never     Passive exposure: Never    Smokeless tobacco: Never   Vaping Use    Vaping status: Never Used   Substance and Sexual Activity    Alcohol use: Never    Drug use: Never   Other Topics Concern    Caffeine Concern No    Exercise No    Seat Belt No    Special Diet No    Stress Concern No    Weight Concern No     Social Drivers of Health     Food Insecurity: No Food Insecurity (1/24/2025)    NCSS - Food Insecurity     Worried About Running Out of Food in the Last Year: No     Ran Out of Food in the Last Year: No   Transportation Needs: No Transportation Needs (1/24/2025)    NCSS -  Transportation     Lack of Transportation: No   Housing Stability: Not At Risk (1/24/2025)    NCSS - Housing/Utilities     Has Housing: Yes     Worried About Losing Housing: No     Unable to Get Utilities: No         Family History   Problem Relation Age of Onset    No Known Problems Mother     No Known Problems Father     No Known Problems Sister     Other (autism) Brother     High Cholesterol Maternal Grandmother     Hypertension Maternal Grandfather     Heart Attack Maternal Grandfather 65    Other (lung cancer age 57, smoker) Paternal Grandmother     Cancer Paternal Grandfather         esophageal    Diabetes Paternal Grandfather            PHYSICAL EXAM:      LMP 12/02/2024 (Exact Date)       Wt Readings from Last 3 Encounters:   02/25/25 100 lb 9.6 oz (45.6 kg) (4%, Z= -1.75)*   01/24/25 95 lb 3.2 oz (43.2 kg) (1%, Z= -2.25)*   12/30/24 92 lb 9.6 oz (42 kg) (<1%, Z= -2.53)*     * Growth percentiles are based on CDC (Girls, 2-20 Years) data.         Physical Exam  Eyes:      Conjunctiva/sclera: Conjunctivae normal.   Neck:      Thyroid: Thyroid mass and thyromegaly present.      Comments: Multinodular goiter, unable to appreciate individual nodules  Cardiovascular:      Rate and Rhythm: Normal rate.      Heart sounds: Normal heart sounds.   Pulmonary:      Effort: Pulmonary effort is normal. No respiratory distress.   Abdominal:      General: Bowel sounds are normal.      Palpations: Abdomen is soft.   Musculoskeletal:      Right lower leg: No edema.      Left lower leg: No edema.   Skin:     General: Skin is warm and dry.   Neurological:      Mental Status: She is alert and oriented to person, place, and time.           LABS:See HPI                IMAGING REVIEWED : See HPI        ASSESSMENT/PLAN:      Diagnoses and all orders for this visit:    Subclinical hyperthyroidism  Patient is clinically hyperthyroid, unclear if symptoms are related to thyroid or underlying anxiety  Discussed burning in neck and mouth  is not related to the thyroid function or nodules, discuss with PCP re further work up  Biochemical evaluation demonstrates subclinical hyperthyroid in 1/2025 5/2024 TPO, TG antibodies were negative  Differential etiology includes Graves' disease vs toxic multinodular goiter vs thyroiditis  Patient is taking ashwagandha, which can worsen hyperthyroidism, advise to stop taking it and avoid thyroid support supplementation  Secondary causes of low TSH including pregnancy, use of levothyroxine suppressive doses, glucocorticoids, opioids ruled out.   No history of recent illness that could cause sick euthyroid syndrome  We discussed hyperthyroidism in detail, including symptoms, pathophysiology, diagnosis, treatment options, and monitoring through blood tests  Will obtain thyroid levels 4 weeks after stopping ashwagandha  Will obtain thyroid antibodies to evaluate for Grave's disease  Obtain Thyroid ultrasound to evaluate further for thyromegaly, nodules and vascularity  Will obtain nuclear medicine uptake scan to differentiate toxic nodules vs Graves' disease if antibodies are negative for Graves disease and labs continue to reflect overactive thyroid  We discussed indications for treatment of subclinical hyperthyroidism per latest MAIRO guidelines  - treatment criteria: TSH < 0.1, TSH 0.1 to 0.5 with age > 65, osteoporosis, atrial fibrillation, hyperthyroid symptoms   Will determine need for medication pending labs, consider betablocker if labs continue to show subclinical hyperthyroid  Multinodular goiter  Compressive symptoms: No, discussed that burning sensation around mouth and neck areas is likely not related to thyroid  Last TSH 1/2025 c/w subclinical hyperthyroid  Thyroid US from 5/2024, showed subcentimeter nodules in the right and left lobe, FNA criteria not met at the time  Reviewed in detail risk factors, pathophysiology, natural history and complications of thyroid nodules  Pt has no high-risk factors for  thyroid cancer (fam hx of thyroid cancer in 1st degree relative, h/o head/neck irradiation, exposure to ionizing radiation as child, or prior h/o thyroid cancer).   Update thyroid US this visit     Amenorrhea:   LMP 12/2024  Denies pregnancy, hx of plan B use in the past   Update Tfts per above  If symptoms persist at follow up, evaluate for PCOS, CAH, hyperprolactinemia, and pregnancy      Return in about 4 weeks (around 3/25/2025). Virtual visit      Thank you for allowing me to participate in the care of your patient. Please call if you have any questions or concerns.    The above plan was discussed in detail with the patient who verbalized understanding and agreement.      A total of 62 minutes was spent today on obtaining history, reviewing pertinent labs, reviewing relevant pathophysiology with patient, reviewing outside records, evaluating patient, providing multiple treatment options, completing documentation and orders, and communicating with other providers as appropriate.     Ness Alvarez MD  [facility]       In reviewing this note, please be advised that Dragon Voice Recognition software used to dictate the note may have made errors in recognizing some of the words or phrases.     Note to patient: The 21 Century Cures Act makes medical notes like these available to patients in the interest of transparency. However, be advised this is a medical document. It is intended as peer to peer communication. It is written in medical language and may contain abbreviations or verbiage that are unfamiliar. It may appear blunt or direct. Medical documents are intended to carry relevant information, facts as evident, and the clinical opinion of the practitioner.

## 2025-02-13 ENCOUNTER — TELEPHONE (OUTPATIENT)
Dept: FAMILY MEDICINE CLINIC | Facility: CLINIC | Age: 20
End: 2025-02-13

## 2025-02-13 NOTE — TELEPHONE ENCOUNTER
Pt called office asking if dr can send her an iron prescription that her insurance covers since the one she was just given is not covered.

## 2025-02-19 ENCOUNTER — TELEPHONE (OUTPATIENT)
Dept: FAMILY MEDICINE CLINIC | Facility: CLINIC | Age: 20
End: 2025-02-19

## 2025-02-19 NOTE — TELEPHONE ENCOUNTER
S/w pt and reviewed notes from her recent vs that suggests eval d/t her low TSH and ultrasound results. Explained rationale. Questions answered. She voiced understanding. Advised to keep apt as scheduled as they are hard to get into d/t wait times--has appt there next week.  Pt agrees and appreciated the call

## 2025-02-19 NOTE — TELEPHONE ENCOUNTER
Patient calling to make sure that doctor wants her to see endocrinologist she made an appointment with one but to still make sure that is what doctor wants because her levels were low

## 2025-02-25 ENCOUNTER — OFFICE VISIT (OUTPATIENT)
Facility: CLINIC | Age: 20
End: 2025-02-25
Payer: MEDICAID

## 2025-02-25 VITALS
WEIGHT: 100.63 LBS | BODY MASS INDEX: 19 KG/M2 | SYSTOLIC BLOOD PRESSURE: 100 MMHG | HEIGHT: 61 IN | DIASTOLIC BLOOD PRESSURE: 64 MMHG | OXYGEN SATURATION: 98 % | HEART RATE: 89 BPM

## 2025-02-25 DIAGNOSIS — N91.2 AMENORRHEA: ICD-10-CM

## 2025-02-25 DIAGNOSIS — E04.2 MULTINODULAR GOITER: ICD-10-CM

## 2025-02-25 DIAGNOSIS — E05.90 SUBCLINICAL HYPERTHYROIDISM: Primary | ICD-10-CM

## 2025-02-25 PROCEDURE — 99205 OFFICE O/P NEW HI 60 MIN: CPT | Performed by: INTERNAL MEDICINE

## 2025-03-11 NOTE — PATIENT INSTRUCTIONS
Please complete labs after 8 hrs of fasting between 7 am - 9 am   Please schedule thyroid uptake scan by calling central scheduling  Start metoprolol 25 mg XR once daily for thyroid    Return Visit   [x] Dr. Alvarez in 2 weeks, ok to add patient for virtual visit for 25 at 9:30 am  [x] Virtual visit      [] Fasting/8AM labs   [x] Central scheduling # for ultrasound/nuclear med/CT/MRI/DXA/IR            I    Imaging/testing:   [] No further imaging/testing needed   [x] Uptake and scan ordered   [] Thyroid ultrasound ordered  [] FNA biopsy      To schedule tests:   Call Central [phone] for FNA and uptake and scan scheduling  Thyroid ultrasounds can be scheduled on the [facility].   [facility] is another affordable option in the area to complete ultrasound- you can call them directly to schedule, they will have our order in the system already.  [phone]  Blood tests can be done at any [place] lab location, or at [facility] if that is what you requested.  Most of the labs are walk in meaning you don't need an appointment. You can schedule these on the [facility] a walk in lab to have an appointment ready.  Here are the lab locations: [email or web address]       ** For female patients ONLY ** Please call the office if you before planning pregnancy in the future and once pregnancy is confirmed    General follow up information:  Please let us know if you require any refills at least 1 week prior to your medication running out. If you do run out of medication, please call our office ASAP to request refills (do not wait until your follow up).  Please call us if you experience any problems with insurance coverage of medication, lab work, or imaging.   Lab results and imaging will typically be reviewed at follow up appointments, or within 3-5 business days of ALL results being in if you do not have an appointment scheduled in the near future. Our office will contact you for  any abnormal results requiring more urgent follow up or action.   The on-call pager is for urgent matters only. If you are a type 1 diabetic and run out of insulin after business hours 8AM-4PM, you may call the on-call pager for a refill to a 24 hour pharmacy. If you have adrenal insufficiency and run out of steroids, you may call the on-call pager for a refill to a 24 hour pharmacy. All other refill requests should be requested during business hours.  Office phone number: [phone]  Office hours: Monday-Friday, closed on holidays  Phones open 8:30am-4:30pm    Ness Alvarez MD  [facility]   Office phone number: [phone]  Fax number: [phone]

## 2025-03-11 NOTE — H&P
REASON FOR CONSULTATION:    Chief Complaint   Patient presents with    Follow - Up     Pt here for f/u, labs were completed, no other concerns   Pt c/o a lot of hair loss, sob sometimes, pt feels fatigue.           PHYSICIAN REQUESTING CONSULTATION:    Linda Padilla DO      HPI:    Sandy Hare is a 19 year old female with past medical history of multinodular goiter, subclinical hyperthyroid, GERD, anxiety, iron deficiency anemia, who presents for consultation regarding hyperthyroidism, last visit 2/2025         Hyperthyroidism:  Diagnosis: 4/2024 noted on lab obtained by PCP, patient has had low normal TSH starting ~ 2019, FT4 has remained WNL  Presenting symptoms: hair loss, anxiety, burning of mouth, amenorrhea for 2 months, after researching her lab tests online  Anti-thyroid medications:None  Methimazole:  Beta blocker:  Compliance:NA  Labs:  TFTs:  1/29/25 : TSH 0.4, free T4 1.0, ANC 1.4   3/19/25 TSH 0.39, FT4 1.1, free T3 2.6  6 weeks after stopping ashwagandha  Free T4 by equilibrium dialysis 1.3     Lab Results   Component Value Date    TSH 0.39 (L) 03/17/2025    TSH 0.995 12/23/2019    T4F 1.1 03/17/2025    T4F 1.0 01/29/2025    T4F 0.9 04/16/2024    WBC 3.7 (L) 01/29/2025    WBC 3.7 (L) 04/16/2024    WBC 8.1 01/02/2024    NE 1,484 (L) 01/29/2025    NE 2,113 04/16/2024    NE 4,909 01/02/2024    AST 17 04/16/2024    AST 14 09/05/2023    AST 10 (L) 12/12/2020    ALT 9 04/16/2024    ALT 8 09/05/2023    ALT 13 12/12/2020    CHOLEST 131 12/12/2020    LDL 75 12/12/2020    TRIG 94 (H) 12/12/2020        Thyroid antibodies if any: 3/2025 TSI, TRAB ab negative, 5/2024 TPO, TG antibodies negative    Thyroid Ultrasound performed:  US THYROID (CPT=76536)    Result Date: 3/14/2025  PROCEDURE:  US THYROID (CPT=76536)  COMPARISON:  None.  INDICATIONS:  E04.2 Multinodular goiter E05.90 Subclinical hyperthyroidism  TECHNIQUE:  High-resolution ultrasound of the thyroid gland was performed.  PATIENT STATED HISTORY: (As  transcribed by Technologist)  Patient states symptoms of thyroid issues.  Patient states fatigue, hair loss, weight loss, anxiety, heat intolerance, abnormal menses cycles. and heart palps.  Patient states possible thyroid nodules.    FINDINGS:   MEASUREMENTS: RIGHT LOBE:  5.1 x 1.3 x 1.7 cm LEFT LOBE:  5.2 x 1.1 x 1.7 cm ISTHMUS:  0.3 cm  NODULES:  A nodule in the midpole of the right thyroid measures up to 0.4 x 0.3 x 0.4 cm. The nodule appears solid and cystic and is isoechoic.TR2 - Not Suspicious (No FNA).  An additional nodule in the upper pole of the left thyroid measures up to 0.5 x  0.3 x 0.4 cm and appears solid and cystic and isoechoic.TR2 - Not Suspicious (No FNA).   OTHER:  None.            CONCLUSION:  Bilateral thyroid nodules as above.   ACR TI-RADS recommendations: TR5 - FNA if greater than or equal to 1 cm, follow-up if 0.5-0.9 cm every year for 5 years. TR4 - FNA if greater than or equal to 1.5 cm, follow-up if 1-1.4 cm in 1, 2, 3 and 5 years. TR3 - FNA if greater than or equal to 2.5 cm, follow-up if 1.5-2.4 cm in 1, 3 and 5 years TR1 and TR2 - no FNA or follow-up  Please note ACR TI-RADS recommendations are based on imaging features and size of the nodule only.  In certain clinical circumstances additional or alternative evaluation may be indicated.   LOCATION:  [facility]        Dictated by (CST): Juan Manuel Irving MD on 3/14/2025 at 4:15 PM     Finalized by (CST): Juan Manuel Irving MD on 3/14/2025 at 4:16 PM       US THYROID (CPT=76536)    Result Date: 5/8/2024  PROCEDURE: US THYROID (CPT= 85694)  COMPARISON: None.  INDICATIONS: E05.90 Subclinical hyperthyroidism  TECHNIQUE: High-resolution ultrasound was performed of the thyroid gland.  FINDINGS:  RIGHT LOBE:  Size:   5.3 x 1.8 x 1.1 cm,  5.1 ml.  Echotexture:   Homogeneous with 2 subcentimeter nodules in the mid pole measuring up to 0.3 cm and 0.4 cm respectively both corresponding with TR 4. Doppler Flow:   Normal    LEFT LOBE:  Size:   4.9 x 1.1 x 1.6  cm,  4.3 ml.  Echotexture:   Homogeneous with a 0.7 cm lower pole nodule corresponding with TR 3. Doppler Flow:   Normal    ISTHMUS:   0.3 cm AP diameter in the midline.  Normal echogenicity.  No masses.  OTHER:   No masses or adenopathy.             CONCLUSION:   Multinodular thyroid as described above.  The bilateral subcentimeter above described thyroid nodules do not meet ACR criteria for follow-up sonography or fine-needle aspiration.   ACR THYROID IMAGING REPORTING AND DATA SYSTEM (TI-RADS) GUIDELINES   Score Assessment   Recommendation  TR1:  0 points benign   No FNA required TR2:  2 points not suspicious No FNA required TR3:  3 points     mildly suspicious Greater than or equal to 1.5cm follow-up, greater than or equal to 2.5cm FNA: follow up: 1, 2, 3 and 5 years.  TR4:  4-6 points   moderately suspicious Greater than or equal to 1.0cm follow-up, greater than or equal to 1.5cm FNA, follow up: 1, 2, 3 and 5 years.  TR5: 7 or more points highly suspicious Greater than or equal to 0.5cm follow up, greater than or equal to 1.0cm FNA: annual follow up for 5 years.           Dictated by (CST): Jayme Flood MD on 5/08/2024 at 4:15 PM     Finalized by (CST): Jayme Flood MD on 5/08/2024 at 4:16 PM          Nuclear medicine uptake scan performed:No  Current symptoms:  Unintentional weight loss:, none, reports weight loss when she is ill and wit changes in diet   Heat intolerance/ diaphoresis: Sweaty palms, cold hands at rest from time to time    Palpitations/SOB/ chest pain: SOB + palpitations when she is anxious  Fatigue:yes  Mood: has underlying anxiety at baseline, Anxiety, insomnia  Diarrhea/ frequent bowel movements: No  Menstrual cycle: used to take plan B, hx of irregular cycles in the past, amenorrhea starting 12/2024  Skin/hair/nail changes: hair loss +  Eye symptoms if any:None  Neck swelling:No, reports burning sensation around mouth and neck  Compressive symptoms: No Dysphagia, voice changes,  SOB  Family history of thyroid disorders:None  Exposure to XRT to head and neck or ionizing radiation in childhood:no  Recent steroids,contrast exposure/amiodarone/ immune therapy/biotin/herbal supplements: Iron, MVI, ashwagandha for stress( started 2-3 weeks ago, per  tic tavia, using the franck brand)  Recent vaccines/viral symptoms: Viral URI in 12/2024   Pregnancy plans if applicable: None    3/25/25 interval hx:   Stopped ashwagandha after 2/2025 visit   Patient continues to report palpitations, tremors, anxiety, hair loss and amenorrhea   Labs c/w mild subclinical hyperthyroid and mild prolactin elevation  Thyroid US stable compared to 5/2024   Patient also reports spontaneously galactorrhea on and off for years, started amitryptiline for GI issues mid March 2025  Denies pregnancy, no sexually active in the last few months        ROS      Review of Systems   Constitutional:  Positive for fatigue. Negative for activity change, appetite change, diaphoresis and unexpected weight change.   HENT:  Negative for sore throat, trouble swallowing and voice change.    Eyes:  Negative for photophobia and visual disturbance.   Respiratory:  Positive for shortness of breath. Negative for cough and choking.    Cardiovascular:  Positive for palpitations. Negative for chest pain and leg swelling.   Gastrointestinal:  Positive for nausea. Negative for constipation, diarrhea and vomiting.   Endocrine: Negative for cold intolerance, heat intolerance, polydipsia, polyphagia and polyuria.   Genitourinary:  Positive for menstrual problem. Negative for frequency.   Musculoskeletal:  Negative for arthralgias, back pain and myalgias.   Skin:  Negative for rash.        Hair loss +   Neurological:  Negative for dizziness, tremors, seizures, syncope, weakness, light-headedness, numbness and headaches.   Hematological:  Does not bruise/bleed easily.   Psychiatric/Behavioral:  Positive for sleep disturbance. Negative for decreased concentration  and dysphoric mood. The patient is nervous/anxious.          Past Medical History:    On Accutane therapy         No past surgical history on file.      Current Outpatient Medications   Medication Sig Dispense Refill    dicyclomine 10 MG Oral Cap Take 1-2 capsules (10-20 mg total) by mouth 4 (four) times daily as needed.      Ferrous Sulfate Dried ER (SLOW IRON) 160 (50 Fe) MG Oral Tab CR Take 160 mg by mouth daily as needed. Take iron during menses and on days do not consume meat 90 tablet 3    Fiber Adult Gummies 2 g Oral Chew Tab Chew 2 tablets by mouth at bedtime. Buy over-the-counter. 180 tablet 3    docusate sodium 100 MG Oral Tab Take 1 tablet (100 mg total) by mouth 2 (two) times daily as needed for constipation. BUY OTC if not covered- compare to \" Colace\" name brand at local pharmacy 60 tablet 0    Omeprazole 40 MG Oral Capsule Delayed Release TAKE 1 CAPSULE BY MOUTH DAILY 30 MINUTES BEFORE FOOD OR OTHER MEDS      sucralfate 1 g Oral Tab Take 1 tablet (1 g total) by mouth 4 (four) times daily. BEFORE MEALS & BEDTIME      fluticasone propionate 50 MCG/ACT Nasal Suspension 2 sprays by Each Nare route daily. 16 g 0    Ferrous Sulfate Dried ER (SLOW IRON) 160 (50 Fe) MG Oral Tab CR Take 160 mg by mouth daily as needed. Take 1 tab by mouth on days do not consume meat or during menses 90 tablet 2         Allergies   Allergen Reactions    Doxycycline OTHER (SEE COMMENTS)     patient does not was this medication do to expressing headaches and abdomen pain    Metronidazole OTHER (SEE COMMENTS)     patient does not was this medication do to expressing headaches and abdomen pain    Penicillin G RASH         Social History     Socioeconomic History    Marital status: Single   Tobacco Use    Smoking status: Never     Passive exposure: Never    Smokeless tobacco: Never   Vaping Use    Vaping status: Never Used   Substance and Sexual Activity    Alcohol use: Never    Drug use: Never   Other Topics Concern    Caffeine  Concern No    Exercise No    Seat Belt No    Special Diet No    Stress Concern No    Weight Concern No     Social Drivers of Health     Food Insecurity: No Food Insecurity (1/24/2025)    NCSS - Food Insecurity     Worried About Running Out of Food in the Last Year: No     Ran Out of Food in the Last Year: No   Transportation Needs: No Transportation Needs (1/24/2025)    NCSS - Transportation     Lack of Transportation: No   Housing Stability: Not At Risk (1/24/2025)    NCSS - Housing/Utilities     Has Housing: Yes     Worried About Losing Housing: No     Unable to Get Utilities: No         Family History   Problem Relation Age of Onset    No Known Problems Mother     No Known Problems Father     No Known Problems Sister     Other (autism) Brother     High Cholesterol Maternal Grandmother     Hypertension Maternal Grandfather     Heart Attack Maternal Grandfather 65    Other (lung cancer age 57, smoker) Paternal Grandmother     Cancer Paternal Grandfather         esophageal    Diabetes Paternal Grandfather            PHYSICAL EXAM:      LMP 12/02/2024 (Exact Date)       Wt Readings from Last 3 Encounters:   03/25/25 103 lb (46.7 kg) (6%, Z= -1.55)*   02/25/25 100 lb 9.6 oz (45.6 kg) (4%, Z= -1.75)*   01/24/25 95 lb 3.2 oz (43.2 kg) (1%, Z= -2.25)*     * Growth percentiles are based on CDC (Girls, 2-20 Years) data.           GEN: NAD, a/o x 3  HEENT: normocephalic, atraumatic   Resp: breathing comfortably without use of accessory muscles   CV: no LE edema, pt reported  Abd: soft per pt, non tender to self palpation  Neuro: speech coherent  Psych: euthymic       LABS:See HPI                IMAGING REVIEWED : See HPI        ASSESSMENT/PLAN:      Diagnoses and all orders for this visit:    Subclinical hyperthyroidism  Patient is clinically hyperthyroid, unclear if symptoms are related to thyroid or underlying anxiety  Discussed burning in neck and mouth is not related to the thyroid function or nodules, discuss with PCP  re further work up  Biochemical evaluation demonstrates subclinical hyperthyroid in 1/2025-> 3/2025 labs obtained after stopping ashwagandha for 4 weeks showed subclinical hyperthyroidism  5/2024 TPO, TG antibodies were negative-> 3/2025 ab TSI, TRAB negative   3/2025 thyroid US  stable compared to 5/2024 US, with subcentimeter TR2 nodules not requiring FNA or US follow up   Differential etiology includes  thyroiditis  Patient had hx  taking ashwagandha around the time of labs in 1/2025, , which can worsen hyperthyroidism, this persisted on repeat labs in 3/2025 after stopping ashwagandha   Secondary causes of low TSH including pregnancy, use of levothyroxine suppressive doses, glucocorticoids, opioids ruled out.   No history of recent illness that could cause sick euthyroid syndrome  We discussed hyperthyroidism in detail, including symptoms, pathophysiology, diagnosis, treatment options, and monitoring through blood tests  Will obtain nuclear medicine uptake scan to differentiate toxic nodules vs Graves' disease if antibodies are negative for Graves disease and labs continue to reflect overactive thyroid  We discussed indications for treatment of subclinical hyperthyroidism per latest MARIO guidelines  - treatment criteria: TSH < 0.1, TSH 0.1 to 0.5 with age > 65, osteoporosis, atrial fibrillation, hyperthyroid symptoms   -At this time, patient is not meeting indications for antithyroid Rx, risks of Rx outweigh benefits   Offered  betablocker for symptomatic improvement, pros and cons discussed, patient opted is amenable   -Start metoprolol 25 mg extended release once daily with breakfast  -Update TFTs in 3 months [June 2025]    Multinodular goiter  Compressive symptoms: None, at 2/2025 visit we discussed that burning sensation around mouth and neck areas is likely not related to thyroid  Last TSH 1/2025 c/w subclinical hyperthyroid-> 3/2025 per above  Thyroid US from 5/2024, showed subcentimeter nodules in the  right and left lobe, FNA criteria not met at the time-> 3/2025 thyroid US per above-> FNA is not indicated at this time  Reviewed in detail risk factors, pathophysiology, natural history and complications of thyroid nodules  Pt has no high-risk factors for thyroid cancer (fam hx of thyroid cancer in 1st degree relative, h/o head/neck irradiation, exposure to ionizing radiation as child, or prior h/o thyroid cancer).       Amenorrhea:   LMP 12/2024  Denies pregnancy, hx of plan B use frequently  in the past , stopped 9/2024  3/2025 TFTs per above , management per above  3/2025 labs with mild prolactin elevation 30.1  Advised to avoid nipple stimulation before lab work  Patient reports acne, significant hair fall, denies hirsutism ,  bloating  Will evaluate for PCOS, CAH, hyperprolactinemia, and pregnancy this visit  The patient has upcoming appointment with OB/GYN in April 2025  We discussed that if hormonal evaluation returns within normal range, defer to OB/GYN regarding further evaluation/management of amenorrhea    Hyperprolactinemia  Mild hyperprolactinemia noted on labs in 3/2025  2/2025 prolactin 30.1 [< 30 )  Symptoms: Irregular cycles/ amenorrhea, reports spontaneous galactorrhea, no immediate pregnancy plans  We discuss differential diagnosis, complications and treatment options if hyperprolactinemia persists at follow up   No follow-ups on file. Patient is requesting follow up in 2 weeks to review results       Thank you for allowing me to participate in the care of your patient. Please call if you have any questions or concerns.    The above plan was discussed in detail with the patient who verbalized understanding and agreement.      A total of 43  minutes was spent today on obtaining history, reviewing pertinent labs, reviewing relevant pathophysiology with patient, reviewing outside records, evaluating patient, providing multiple treatment options, completing documentation and orders, and communicating  with other providers as appropriate.     Ness Alvarez MD  [facility]       In reviewing this note, please be advised that Dragon Voice Recognition software used to dictate the note may have made errors in recognizing some of the words or phrases.     Note to patient: The 21 Century Cures Act makes medical notes like these available to patients in the interest of transparency. However, be advised this is a medical document. It is intended as peer to peer communication. It is written in medical language and may contain abbreviations or verbiage that are unfamiliar. It may appear blunt or direct. Medical documents are intended to carry relevant information, facts as evident, and the clinical opinion of the practitioner.

## 2025-03-14 ENCOUNTER — TELEMEDICINE (OUTPATIENT)
Dept: FAMILY MEDICINE CLINIC | Facility: CLINIC | Age: 20
End: 2025-03-14
Payer: MEDICAID

## 2025-03-14 ENCOUNTER — HOSPITAL ENCOUNTER (OUTPATIENT)
Dept: ULTRASOUND IMAGING | Age: 20
Discharge: HOME | End: 2025-03-14
Attending: INTERNAL MEDICINE
Payer: MEDICAID

## 2025-03-14 DIAGNOSIS — D50.0 IRON DEFICIENCY ANEMIA DUE TO CHRONIC BLOOD LOSS: Primary | ICD-10-CM

## 2025-03-14 DIAGNOSIS — E04.2 MULTINODULAR GOITER: ICD-10-CM

## 2025-03-14 DIAGNOSIS — E05.90 SUBCLINICAL HYPERTHYROIDISM: ICD-10-CM

## 2025-03-14 DIAGNOSIS — N91.2 AMENORRHEA: ICD-10-CM

## 2025-03-14 DIAGNOSIS — S01.80XA OPEN WOUND OF FACE, INITIAL ENCOUNTER: ICD-10-CM

## 2025-03-14 DIAGNOSIS — L98.9 FACIAL LESION: ICD-10-CM

## 2025-03-14 PROCEDURE — 76536 US EXAM OF HEAD AND NECK: CPT | Performed by: INTERNAL MEDICINE

## 2025-03-14 RX ORDER — AMITRIPTYLINE HYDROCHLORIDE 10 MG/1
TABLET ORAL
COMMUNITY
Start: 2025-03-13 | End: 2025-09-01

## 2025-03-14 RX ORDER — IRON/VIT C/FRUCTOOLIGOSACCHARD 30-10-25MG
TABLET,CHEWABLE ORAL
COMMUNITY

## 2025-03-14 RX ORDER — MUPIROCIN 20 MG/G
1 OINTMENT TOPICAL 3 TIMES DAILY
Qty: 30 G | Refills: 0 | Status: SHIPPED | OUTPATIENT
Start: 2025-03-14

## 2025-03-14 NOTE — PROGRESS NOTES
The patient's office visit was performed as a video visit.  Time Spent:23 min +7 minutes writing a note for a total of 30 minutes    Chief Complaint   Patient presents with    Menstrual Problem     per patient no period since 12/2024    Derm Problem     Patient has noticed a red wart on face     Iron Deficiency     Subjective     HPI:   Sandy Hare is a 19 year old female who presents for amenorrhea and iron deficiency anemia.    The following individual(s) verbally consented to be recorded using ambient AI listening technology and understand that they can each withdraw their consent to this listening technology at any point by asking the clinician to turn off or pause the recording:    Patient name: Sandy Hare  Additional names:  none    History of Present Illness    Miss Sandy Hare is a 19 year old female who presents with a red juan jose on her left cheek.    The red juan jose on her left cheek began as a small, palpable area under the skin below her periorbital area approximately two years ago. Over time, it became pruritic, leading her to scratch it, which caused it to enlarge and eventually bleed. The lesion has been consistently erythematous and is described as having a 'bubbly grape' appearance. Three weeks ago, her brother-in-law attempted to remove the lesion by tying a piece of hair around it, causing it to turn white temporarily. The lesion subsequently fell off, leaving a red, ulcerated area that she describes as a wound. It remains intermittently pruritic, but not severely so. No recent fever or increased drainage from the facial wound.    She has a history of iron deficiency anemia and is currently taking iron gummies, each containing 18 mg of iron daily. She takes two gummies daily,  and has been on this regimen for about 6 weeks. Previous lab results showed hemoglobin at 11.0 g/dL, indicating mild anemia. She experiences less constipation with gummies compared to other iron supplements.   Did not purchase Slow Fe due to cost but did not compare to generics.    She has not had her menstrual period since December 2nd. A pregnancy test conducted during a recent endoscopy in January was negative. She has not been sexually active since December 2nd. She has a history of taking the morning-after pill frequently.    She is undergoing further evaluation for thyroid issues-low TSH/subclinical hyperthyroidism, including an ultrasound and blood tests, as her endocrinologist suspects subclinical hyperthyroidism. She has small nodules on her right thyroid.  She has scheduled appointment for follow-up to see her endocrinologist on 3/25/2025,        Chief Complaint Reviewed and Verified  Nursing Notes Reviewed and   Verified  Tobacco Reviewed  Allergies Reviewed  Medications Reviewed    OB Status Reviewed                Medications Ordered Prior to Encounter[1]        Physical Exam:  LMP 12/02/2024 (Exact Date)     alert and cooperative, well-nourished/well-hydrated, no no pallor or tachypnea, appropriately groomed, speaking in full sentences comfortably and Normal work of breathing, answers questions appropriately      Office Visit on 01/24/2025   Component Date Value    WHITE BLOOD CELL COUNT 01/29/2025 3.7 (L)     RED BLOOD CELL COUNT 01/29/2025 3.31 (L)     HEMOGLOBIN 01/29/2025 11.0 (L)     HEMATOCRIT 01/29/2025 31.5 (L)     MCV 01/29/2025 95.2     MCH 01/29/2025 33.2 (H)     MCHC 01/29/2025 34.9     RDW 01/29/2025 14.2     PLATELET COUNT 01/29/2025 141     MPV 01/29/2025 13.9 (H)     ABSOLUTE NEUTROPHILS 01/29/2025 1,484 (L)     ABSOLUTE LYMPHOCYTES 01/29/2025 1,684     ABSOLUTE MONOCYTES 01/29/2025 303     ABSOLUTE EOSINOPHILS 01/29/2025 200     ABSOLUTE BASOPHILS 01/29/2025 30     NEUTROPHILS 01/29/2025 40.1     LYMPHOCYTES 01/29/2025 45.5     MONOCYTES 01/29/2025 8.2     EOSINOPHILS 01/29/2025 5.4     BASOPHILS 01/29/2025 0.8     Chlamydia Trachomatis Am* 01/24/2025 Negative     Neisseria  Gonorrhoeae Am* 01/24/2025 Negative     Chlam/GC Source 01/24/2025 Urine     Pregnancy Test, Urine 01/24/2025 negative     Control Line Present wit* 01/24/2025 yes     Kit Lot # 01/24/2025 [id number]     Kit Expiration Date 01/24/2025 01/06/2026     TSH W/REFLEX TO FT4 01/29/2025 0.40 (L)     T4, FREE 01/29/2025 1.0     IRON, TOTAL 01/29/2025 39     IRON BINDING CAPACITY 01/29/2025 319     % SATURATION 01/29/2025 12 (L)     FERRITIN 01/29/2025 7 (L)            Assessment    Diagnoses and all orders for this visit:    Iron deficiency anemia due to chronic blood loss    Facial lesion  -     mupirocin 2 % External Ointment; Apply 1 Application topically 3 (three) times daily.    Open wound of face, initial encounter  -     mupirocin 2 % External Ointment; Apply 1 Application topically 3 (three) times daily.    Amenorrhea  -     HCG, BETA SUBUNIT, QUAL [8435] [Q]  -     PROLACTIN [745] [Q]  -     OBG Referral - In Network    Subclinical hyperthyroidism       Assessment & Plan  Facial Ulcer  Presents with a facial ulcer on the left cheek due to self removal of a skin lesion, skin lesion developed two years. Initially a small dot under the skin, it became raised and red. The lesion was tied off with a hair, leading to ulceration. The area is now red and may be slightly infected. Differential diagnosis includes a hemangioma, skin tag, mole, or wart. The lesion is currently ulcerated with a whitish center and is itchy.  - Prescribe mupirocin ointment to apply to the affected area for two weeks.  - Advise not to scratch the area to prevent infection.  - Instruct to seek medical attention if there is increased pus, drainage, fever, or if the condition does not improve in three weeks-call for appointment.    Subclinical Hyperthyroidism  Diagnosed with subclinical hyperthyroidism and has multiple thyroid nodules. Scheduled for a another thyroid ultrasound and follow-up with an endocrinologist.  - Advise to follow up with the  endocrinologist on March 25th for further evaluation and management-discussed possibility this is causing the amenorrhea.    Amenorrhea  Has not had a menstrual period since December 2nd. A recent pregnancy test was negative. Not sexually active since December 2nd. Scheduled for thyroid ultrasound and blood tests, as subclinical hyperthyroidism and thyroid nodules may be contributing to amenorrhea. Prolactin level test ordered to assess for potential causes.  - Order prolactin level test to assess for potential causes of amenorrhea.  - Refer to an OB-GYN for further evaluation and management of amenorrhea.  - Advise to discuss the potential impact of thyroid function on menstrual cycles with the endocrinologist.    Iron Deficiency Anemia  Currently taking iron gummies for iron deficiency anemia, consuming 18 mg of iron daily. Hemoglobin level is 11.0 g/dL, indicating mild anemia. Experiences less constipation with gummies compared to other iron supplements. Recommended to increase iron intake to 36 mg total daily to improve anemia. Discussed potential benefits of switching to a slow-release iron supplement if iron levels do not improve.  -[employer] has slow release iron tablets-30 for $3.85-may be easier to take 1 pill daily instead of 2 Gummies twice daily  - Advise to take two iron gummies twice a day to increase iron intake.  - Order lab work to assess iron levels in another month and a half.  - Discuss the potential benefits of switching to a slow-release iron supplement if iron levels do not improve.        Lab work ordered.  Recommended OTC medications- generic slow release iron 160mg/65mg of elemental iron daily  Reinforced healthy diet, lifestyle, and exercise.    Return in about 2 months (around 5/14/2025) for recheck anemia, sooner if facial lesion not healed in 3-4 weeks..    Linda Padilla, DO Sandy Hare understands video or phone evaluation is not a substitute for face-to-face examination or  emergency care. Patient advised to go to ER or call 911 for worsening symptoms or acute distress.     Patient/Caregiver Education: Patient/Caregiver Education: There are no barriers to learning. Medical education done.   Outcome: Patient verbalizes understanding. Patient is notified to call with any questions, complications, allergies, or worsening or changing symptoms.  Patient is to call with any side effects or complications from the treatments as a result of today.     Please note that the following visit was completed using two-way, real-time interactive audio and/or video communication.  This has been done in good yaima to provide continuity of care in the best interest of the provider-patient relationship, due to the on-going public health crisis/national emergency and because of restrictions of visitation.  There are limitations of this visit as no physical exam could be performed.  Every conscious effort was taken to allow for sufficient and adequate time.  This billing visit was spent on reviewing labs, medications, radiology tests and decision making.  Appropriate medical decision-making and tests are ordered as detailed in the plan of care above.          [1]   Current Outpatient Medications on File Prior to Visit   Medication Sig Dispense Refill    amitriptyline 10 MG Oral Tab Take by mouth.      Iron Carbonyl-Vitamin C-FOS (CHEWABLE IRON) 30-10-25 MG Oral Chew Tab Chew by mouth. iron gummies 2 by mouth      dicyclomine 10 MG Oral Cap Take 1-2 capsules (10-20 mg total) by mouth 4 (four) times daily as needed.      docusate sodium 100 MG Oral Tab Take 1 tablet (100 mg total) by mouth 2 (two) times daily as needed for constipation. BUY OTC if not covered- compare to \" Colace\" name brand at local pharmacy 60 tablet 0    Omeprazole 40 MG Oral Capsule Delayed Release TAKE 1 CAPSULE BY MOUTH DAILY 30 MINUTES BEFORE FOOD OR OTHER MEDS      Fiber Adult Gummies 2 g Oral Chew Tab Chew 2 tablets by mouth at  bedtime. Buy over-the-counter. 180 tablet 3     No current facility-administered medications on file prior to visit.

## 2025-03-18 LAB
HCG, TOTAL, QL: NEGATIVE
PROLACTIN: 30.1 NG/ML

## 2025-03-22 LAB
Lab: 1.3 NG/DL (ref 0.9–2.2)
T3, FREE: 2.6 PG/ML (ref 3–4.7)
T4, FREE: 1.1 NG/DL (ref 0.8–1.4)
TRAB: 1.04 IU/L
TSH: 0.39 MIU/L
TSI: <89 % BASELINE

## 2025-03-24 PROBLEM — E22.1 HYPERPROLACTINEMIA (HCC): Status: ACTIVE | Noted: 2025-03-24

## 2025-03-25 ENCOUNTER — OFFICE VISIT (OUTPATIENT)
Facility: CLINIC | Age: 20
End: 2025-03-25
Payer: MEDICAID

## 2025-03-25 VITALS
DIASTOLIC BLOOD PRESSURE: 60 MMHG | OXYGEN SATURATION: 99 % | WEIGHT: 103 LBS | BODY MASS INDEX: 19.45 KG/M2 | HEIGHT: 61 IN | HEART RATE: 104 BPM | RESPIRATION RATE: 18 BRPM | SYSTOLIC BLOOD PRESSURE: 112 MMHG

## 2025-03-25 DIAGNOSIS — E22.1 HYPERPROLACTINEMIA (HCC): ICD-10-CM

## 2025-03-25 DIAGNOSIS — E04.2 MULTINODULAR GOITER: ICD-10-CM

## 2025-03-25 DIAGNOSIS — N91.2 AMENORRHEA: ICD-10-CM

## 2025-03-25 DIAGNOSIS — E05.90 SUBCLINICAL HYPERTHYROIDISM: Primary | ICD-10-CM

## 2025-03-25 PROCEDURE — 99215 OFFICE O/P EST HI 40 MIN: CPT | Performed by: INTERNAL MEDICINE

## 2025-03-25 RX ORDER — METOPROLOL SUCCINATE 25 MG/1
25 TABLET, EXTENDED RELEASE ORAL DAILY
Qty: 90 TABLET | Refills: 1 | Status: SHIPPED | OUTPATIENT
Start: 2025-03-25 | End: 2025-09-21

## 2025-03-28 ENCOUNTER — OFFICE VISIT (OUTPATIENT)
Dept: FAMILY MEDICINE CLINIC | Facility: CLINIC | Age: 20
End: 2025-03-28
Payer: MEDICAID

## 2025-03-28 VITALS
HEART RATE: 105 BPM | TEMPERATURE: 98 F | OXYGEN SATURATION: 96 % | BODY MASS INDEX: 20.58 KG/M2 | HEIGHT: 61 IN | WEIGHT: 109 LBS | DIASTOLIC BLOOD PRESSURE: 70 MMHG | SYSTOLIC BLOOD PRESSURE: 116 MMHG | RESPIRATION RATE: 16 BRPM

## 2025-03-28 DIAGNOSIS — D18.01 HEMANGIOMA OF SKIN: ICD-10-CM

## 2025-03-28 DIAGNOSIS — R19.8 FUNCTIONAL GASTROINTESTINAL SYMPTOMS: ICD-10-CM

## 2025-03-28 DIAGNOSIS — N91.2 AMENORRHEA: Primary | ICD-10-CM

## 2025-03-28 DIAGNOSIS — D50.0 IRON DEFICIENCY ANEMIA DUE TO CHRONIC BLOOD LOSS: ICD-10-CM

## 2025-03-28 DIAGNOSIS — K59.04 CHRONIC IDIOPATHIC CONSTIPATION: ICD-10-CM

## 2025-03-28 DIAGNOSIS — E04.2 MULTINODULAR GOITER: ICD-10-CM

## 2025-03-28 DIAGNOSIS — L65.9 HAIR LOSS: ICD-10-CM

## 2025-03-28 DIAGNOSIS — E05.90 SUBCLINICAL HYPERTHYROIDISM: ICD-10-CM

## 2025-03-28 PROCEDURE — 99214 OFFICE O/P EST MOD 30 MIN: CPT | Performed by: FAMILY MEDICINE

## 2025-03-28 RX ORDER — POLYETHYLENE GLYCOL 3350 17 G/17G
17 POWDER, FOR SOLUTION ORAL DAILY
Qty: 238 G | Refills: 5 | Status: SHIPPED | OUTPATIENT
Start: 2025-03-28

## 2025-03-28 NOTE — PROGRESS NOTES
The following individual(s) verbally consented to be recorded using ambient AI listening technology and understand that they can each withdraw their consent to this listening technology at any point by asking the clinician to turn off or pause the recording:    Patient name: Sandy Hare  Additional names:  none

## 2025-03-28 NOTE — PROGRESS NOTES
CHIEF COMPLAINT:   Chief Complaint   Patient presents with    Neck Pain     Patient c/o of neck pain 7/10.       Derm Problem       Patient has noticed a red wart on face, hair loss         Menstrual Problem     per patient no period since 12/2024        Lab     Discuss labs results          HPI:     Sandy Hare is a 19 year old female presents for  presents for multiple concerns    The following individual(s) verbally consented to be recorded using ambient AI listening technology and understand that they can each withdraw their consent to this listening technology at any point by asking the clinician to turn off or pause the recording:    Patient name: Sandy Hare  History of Present Illness  Miss Sandy Hare is a 19 year old female with subclinical hypothyroidism and functional heartburn who presents with burning sensations in the neck and chest.    She experiences a burning sensation in the back of her neck and chest, sometimes radiating to the front and back of her neck. The burning is often triggered by talking and typically occurs about twenty minutes after waking up. Despite being on omeprazole since December, there has been no relief. Amitriptyline was prescribed two weeks ago, with a recent dose increase to 20 mg, but the burning persists.    She has a history of subclinical hypothyroidism and a goiter with non-suspicious nodules. A nuclear medicine test is scheduled to evaluate the nodules further. She reports hair loss, which she finds concerning, and has been experiencing galactorrhea with slightly elevated prolactin levels at 30.1. She is also experiencing anxiety and heart palpitations, for which she is taking metoprolol 25 mg, but she has not noticed any improvement in symptoms but recently started.  Denies any dizziness or lightheadedness or syncope.    She mentions having a hemangioma on her right cheek, which itches and feels like a needle is poking it. She has a history of acne  and has previously seen a dermatologist for treatment. She also reports irregular periods and has undergone hormone tests to evaluate for PCOS, with results pending.    She is currently taking iron gummies for iron deficiency anemia and reports no side effects from the iron. She mentions that metoprolol may be causing constipation, and she is considering using fiber gummies.  Has MiraLAX at home but is not taking it regularly.              HISTORY:  Past Medical History:    On Accutane therapy      History reviewed. No pertinent surgical history.   Family History   Problem Relation Age of Onset    No Known Problems Mother     No Known Problems Father     No Known Problems Sister     Other (autism) Brother     High Cholesterol Maternal Grandmother     Hypertension Maternal Grandfather     Heart Attack Maternal Grandfather 65    Other (lung cancer age 57, smoker) Paternal Grandmother     Cancer Paternal Grandfather         esophageal    Diabetes Paternal Grandfather       Social History:   Social History     Socioeconomic History    Marital status: Single   Tobacco Use    Smoking status: Never     Passive exposure: Never    Smokeless tobacco: Never   Vaping Use    Vaping status: Never Used   Substance and Sexual Activity    Alcohol use: Never    Drug use: Never   Other Topics Concern    Caffeine Concern No    Exercise No    Seat Belt No    Special Diet No    Stress Concern No    Weight Concern No     Social Drivers of Health     Food Insecurity: No Food Insecurity (1/24/2025)    NCSS - Food Insecurity     Worried About Running Out of Food in the Last Year: No     Ran Out of Food in the Last Year: No   Transportation Needs: No Transportation Needs (1/24/2025)    NCSS - Transportation     Lack of Transportation: No   Housing Stability: Not At Risk (1/24/2025)    NCSS - Housing/Utilities     Has Housing: Yes     Worried About Losing Housing: No     Unable to Get Utilities: No        Medications (Active prior to today's  visit):  Current Outpatient Medications   Medication Sig Dispense Refill    amitriptyline 10 MG Oral Tab Take by mouth.      Iron Carbonyl-Vitamin C-FOS (CHEWABLE IRON) 30-10-25 MG Oral Chew Tab Chew by mouth. iron gummies 2 by mouth      mupirocin 2 % External Ointment Apply 1 Application topically 3 (three) times daily. 30 g 0    dicyclomine 10 MG Oral Cap Take 1-2 capsules (10-20 mg total) by mouth 4 (four) times daily as needed.      docusate sodium 100 MG Oral Tab Take 1 tablet (100 mg total) by mouth 2 (two) times daily as needed for constipation. BUY OTC if not covered- compare to \" Colace\" name brand at local pharmacy 60 tablet 0    Omeprazole 40 MG Oral Capsule Delayed Release TAKE 1 CAPSULE BY MOUTH DAILY 30 MINUTES BEFORE FOOD OR OTHER MEDS      metoprolol succinate ER 25 MG Oral Tablet 24 Hr Take 1 tablet (25 mg total) by mouth daily. 90 tablet 1    Fiber Adult Gummies 2 g Oral Chew Tab Chew 2 tablets by mouth at bedtime. Buy over-the-counter. 180 tablet 3       Allergies:  Allergies[1]    PSFH elements reviewed from today and agreed except as otherwise stated in HPI.  PHYSICAL EXAM:   /70   Pulse 105   Temp 97.8 °F (36.6 °C) (Temporal)   Resp 16   Ht 5' 1\" (1.549 m)   Wt 109 lb (49.4 kg)   LMP 12/02/2024 (Exact Date)   SpO2 96%   BMI 20.60 kg/m²   Vital signs reviewed.Appears stated age, well groomed.  Physical Exam   GENERAL: well developed, well nourished,in no apparent distress  EYES; PERRLA, EOM-i B/L. Sclera clear and non icteric bilateral  SKIN: no rashes,no suspicious lesions-see photo  HEENT: atraumatic, normocephalic  NECK: supple,no adenopathy,no bruits, no JVD  LUNGS: clear to auscultation bilateral. No RRW. Good inspiratory and expiratory effort  CARDIO: RRR without murmur, clear S1, S2  VS: no carotid artery bruit bilateral.    GI: good BS's,no masses,No HSM or tenderness.   EXTREMITIES: no cyanosis, clubbing or edema, bilateral. No calf tenderness  Left  cheek      Telemedicine on 03/14/2025   Component Date Value    HCG, TOTAL, QL 03/17/2025 NEGATIVE     PROLACTIN 03/17/2025 30.1 (H)    Office Visit on 02/25/2025   Component Date Value    TSI 03/17/2025 <89     T3, FREE 03/17/2025 2.6 (L)     TSH 03/17/2025 0.39 (L)     T4, FREE 03/17/2025 1.1     T4, FREE, DIRECT DIALYSIS 03/17/2025 1.3     TRAB 03/17/2025 1.04       REVIEWED THIS VISIT  ASSESSMENT/PLAN:   19 year old female with    1. Amenorrhea    2. Subclinical hyperthyroidism    3. Multinodular goiter    4. Functional gastrointestinal symptoms    5. Hemangioma of skin  - Derm Referral - In Network  - Derm Referral - In Network    6. Iron deficiency anemia due to chronic blood loss  Continue iron supplements finish Gummies then go to slow iron  Repeat iron levels 5/2025 and follow-up in office with PCP    7. Chronic idiopathic constipation due to iron supplementation  -Start polyethylene glycol, PEG 3350, 17 GM/SCOOP Oral Powder; Take 17 g by mouth daily.  Dispense: 238 g; Refill: 5    8. Hair loss  No baldness, patient has moderate amount of hair.  Discussed that losing up to 100 hairs a day is normal.  Still has eyebrows and eyelashes.  Unlikely any autoimmune.  Iron deficiency anemia, thyroid disease, stress and anxiety can all cause hair loss.  Assessment & Plan  Functional Heartburn  She experiences intermittent burning sensations in the chest and neck, sometimes triggered by talking. Symptoms have not improved with omeprazole, suggesting a non-acidic cause. Diagnosed with functional heartburn by a gastroenterologist, she is on amitriptyline, which may take 4-8 weeks to show effects. Stress and anxiety are contributing factors.  - Continue amitriptyline  - Continue omeprazole  - Encourage stress management techniques    Subclinical Hyperthyroidism  She has subclinical hyperthyroidism with a non-suspicious thyroid nodule. Symptoms include anxiety, heart palpitations, tremors, and hair loss, potentially  related to thyroid function. She is on metoprolol for anxiety and palpitations. A nuclear medicine test is scheduled to assess thyroid nodule activity.  - Continue metoprolol 25 mg  - Proceed with nuclear medicine thyroid uptake test to assess if hot nodule on April 9th  - Follow up with endocrinologist via video call on April 7th    Amenorrhea  She has amenorrhea, possibly related to elevated prolactin levels or thyroid dysfunction. Reports galactorrhea and hair loss. Hormonal tests are pending to rule out PCOS and other causes. Prolactin level is slightly elevated at 30.1, and a prolactin dilution study has been ordered.  - Await hormonal test results  - Follow up with endocrinologist for further evaluation  -Reviewed consult note of endocrinologist, Dr.Tanvi-Jatin Alvarez  -If no evidence of PCOS or prolactinoma or hormonal cause to amenorrhea, patient will follow-up with OB/GYN.    Iron Deficiency Anemia  She is on chewable iron supplements for iron deficiency anemia without side effects. Iron is emphasized for hair health.  Anemia can cause fatigue, hair loss, dizziness has history of syncope from low iron previously.  Did not want to take oral contraceptives-tried and discontinued  - Continue chewable iron supplements  - Check iron levels in mid-May 2025  - Encourage a diet rich in protein  -Follow-up in May 2025 with PCP    Constipation  She reports constipation, possibly related to metoprolol use. Dietary modifications and over-the-counter options are recommended.  - Recommend fiber gummies and increased water intake  - Consider over-the-counter Miralax    Facial Hemangioma-left cheek  She has an itchy hemangioma on the right cheek, which has been manipulated. She desires removal.  - Refer to [facility] for evaluation and potential removal      Time spent with patient was 20 minutes and reviewing endocrinologist note with patient and 11 minutes writing note for a total of 31 minutes    Meds This  Visit:  Requested Prescriptions     Signed Prescriptions Disp Refills    polyethylene glycol, PEG 3350, 17 GM/SCOOP Oral Powder 238 g 5     Sig: Take 17 g by mouth daily.       Health Maintenance:  Health Maintenance   Topic Date Due    COVID-19 Vaccine (4 - 2024-25 season) 09/01/2024    Meningococcal B Vaccine (1 of 2 - Standard) 01/24/2026 (Originally 9/11/2021)    DTaP,Tdap,and Td Vaccines (7 - Td or Tdap) 12/25/2025    Annual Physical  01/24/2026    Influenza Vaccine  Completed    Annual Depression Screening  Completed    Hepatitis B Vaccines  Completed    Hepatitis A Vaccines  Completed    MMR Vaccines  Completed    Varicella Vaccines  Completed    Meningococcal Vaccine  Completed    HPV Vaccines  Completed    Pneumococcal Vaccine: Birth to 50yrs  Aged Out         Patient/Caregiver Education: Patient/Caregiver Education: There are no barriers to learning. Medical education done.   Outcome: Patient verbalizes understanding. Patient is notified to call with any questions, comp lications, allergies, or worsening or changing symptoms.  Patient is to call with any side effects or complications from the treatments as a result of today.     Problem List:     Patient Active Problem List   Diagnosis    Dysmenorrhea in adolescent    Anxiety    Reading impairment    Iron deficiency anemia due to chronic blood loss    Gastritis    History of PID    CANDIDO (generalized anxiety disorder)    Chronic idiopathic constipation    Low TSH level    Subclinical hyperthyroidism    Multinodular goiter    Late menses    Drug-induced constipation    Amenorrhea    Facial lesion    Hyperprolactinemia (HCC)       Imaging & Referrals:  None     3/28/2025  Linda Padilla, DO      Patient understands plan and follow-up.  Return for 05/20/2025.            [1]   Allergies  Allergen Reactions    Doxycycline OTHER (SEE COMMENTS)     patient does not was this medication do to expressing headaches and abdomen pain    Metronidazole OTHER (SEE COMMENTS)      patient does not was this medication do to expressing headaches and abdomen pain    Penicillin G RASH

## 2025-04-07 ENCOUNTER — TELEPHONE (OUTPATIENT)
Facility: CLINIC | Age: 20
End: 2025-04-07

## 2025-04-07 ENCOUNTER — TELEMEDICINE (OUTPATIENT)
Facility: CLINIC | Age: 20
End: 2025-04-07
Payer: MEDICAID

## 2025-04-07 DIAGNOSIS — L65.9 HAIR LOSS: Primary | ICD-10-CM

## 2025-04-07 NOTE — PROGRESS NOTES
REASON FOR CONSULTATION:              PHYSICIAN REQUESTING CONSULTATION:    Linda Padilla DO      HPI:    Sandy Hare is a 19 year old female with past medical history of multinodular goiter, subclinical hyperthyroid, GERD, anxiety, iron deficiency anemia, who presents for consultation regarding hyperthyroidism, last visit 3/2025    This visit was performed via live interactive two-way video.  Clinician Location: [facility] [place]  Patient Location: home   Sandy Hare verbally consents to a telemedicine service with live, interactive video and audio on 4/7/2025.  Patient understands and accepts financial responsibility for any deductible, co-insurance and/or co-pays associated with this service.   Hyperthyroidism:  Diagnosis: 4/2024 noted on lab obtained by PCP, patient has had low normal TSH starting ~ 2019, FT4 has remained WNL  Presenting symptoms: hair loss, anxiety, burning of mouth, amenorrhea for 2 months, after researching her lab tests online  Anti-thyroid medications:None  Methimazole:  Beta blocker:  Compliance:NA  Labs:  TFTs:  1/29/25 : TSH 0.4, free T4 1.0, ANC 1.4   3/19/25 TSH 0.39, FT4 1.1, free T3 2.6  6 weeks after stopping ashwagandha  Free T4 by equilibrium dialysis 1.3     Lab Results   Component Value Date    TSH 0.39 (L) 03/17/2025    TSH 0.995 12/23/2019    T4F 1.1 03/17/2025    T4F 1.0 01/29/2025    T4F 0.9 04/16/2024    WBC 3.7 (L) 01/29/2025    WBC 3.7 (L) 04/16/2024    WBC 8.1 01/02/2024    NE 1,484 (L) 01/29/2025    NE 2,113 04/16/2024    NE 4,909 01/02/2024    AST 17 04/16/2024    AST 14 09/05/2023    AST 10 (L) 12/12/2020    ALT 9 04/16/2024    ALT 8 09/05/2023    ALT 13 12/12/2020    CHOLEST 131 12/12/2020    LDL 75 12/12/2020    TRIG 94 (H) 12/12/2020        Thyroid antibodies if any: 3/2025 TSI, TRAB ab negative, 5/2024 TPO, TG antibodies negative    Thyroid Ultrasound performed:  US THYROID (CPT=76536)    Result Date: 3/14/2025  PROCEDURE:  US THYROID (CPT=76536)  COMPARISON:   None.  INDICATIONS:  E04.2 Multinodular goiter E05.90 Subclinical hyperthyroidism  TECHNIQUE:  High-resolution ultrasound of the thyroid gland was performed.  PATIENT STATED HISTORY: (As transcribed by Technologist)  Patient states symptoms of thyroid issues.  Patient states fatigue, hair loss, weight loss, anxiety, heat intolerance, abnormal menses cycles. and heart palps.  Patient states possible thyroid nodules.    FINDINGS:   MEASUREMENTS: RIGHT LOBE:  5.1 x 1.3 x 1.7 cm LEFT LOBE:  5.2 x 1.1 x 1.7 cm ISTHMUS:  0.3 cm  NODULES:  A nodule in the midpole of the right thyroid measures up to 0.4 x 0.3 x 0.4 cm. The nodule appears solid and cystic and is isoechoic.TR2 - Not Suspicious (No FNA).  An additional nodule in the upper pole of the left thyroid measures up to 0.5 x  0.3 x 0.4 cm and appears solid and cystic and isoechoic.TR2 - Not Suspicious (No FNA).   OTHER:  None.            CONCLUSION:  Bilateral thyroid nodules as above.   ACR TI-RADS recommendations: TR5 - FNA if greater than or equal to 1 cm, follow-up if 0.5-0.9 cm every year for 5 years. TR4 - FNA if greater than or equal to 1.5 cm, follow-up if 1-1.4 cm in 1, 2, 3 and 5 years. TR3 - FNA if greater than or equal to 2.5 cm, follow-up if 1.5-2.4 cm in 1, 3 and 5 years TR1 and TR2 - no FNA or follow-up  Please note ACR TI-RADS recommendations are based on imaging features and size of the nodule only.  In certain clinical circumstances additional or alternative evaluation may be indicated.   LOCATION:  [facility]        Dictated by (CST): Juan Manuel Irving MD on 3/14/2025 at 4:15 PM     Finalized by (CST): Juan Manuel Irving MD on 3/14/2025 at 4:16 PM       US THYROID (CPT=76536)    Result Date: 5/8/2024  PROCEDURE: US THYROID (CPT= 89261)  COMPARISON: None.  INDICATIONS: E05.90 Subclinical hyperthyroidism  TECHNIQUE: High-resolution ultrasound was performed of the thyroid gland.  FINDINGS:  RIGHT LOBE:  Size:   5.3 x 1.8 x 1.1 cm,  5.1 ml.  Echotexture:    Homogeneous with 2 subcentimeter nodules in the mid pole measuring up to 0.3 cm and 0.4 cm respectively both corresponding with TR 4. Doppler Flow:   Normal    LEFT LOBE:  Size:   4.9 x 1.1 x 1.6 cm,  4.3 ml.  Echotexture:   Homogeneous with a 0.7 cm lower pole nodule corresponding with TR 3. Doppler Flow:   Normal    ISTHMUS:   0.3 cm AP diameter in the midline.  Normal echogenicity.  No masses.  OTHER:   No masses or adenopathy.             CONCLUSION:   Multinodular thyroid as described above.  The bilateral subcentimeter above described thyroid nodules do not meet ACR criteria for follow-up sonography or fine-needle aspiration.   ACR THYROID IMAGING REPORTING AND DATA SYSTEM (TI-RADS) GUIDELINES   Score Assessment   Recommendation  TR1:  0 points benign   No FNA required TR2:  2 points not suspicious No FNA required TR3:  3 points     mildly suspicious Greater than or equal to 1.5cm follow-up, greater than or equal to 2.5cm FNA: follow up: 1, 2, 3 and 5 years.  TR4:  4-6 points   moderately suspicious Greater than or equal to 1.0cm follow-up, greater than or equal to 1.5cm FNA, follow up: 1, 2, 3 and 5 years.  TR5: 7 or more points highly suspicious Greater than or equal to 0.5cm follow up, greater than or equal to 1.0cm FNA: annual follow up for 5 years.           Dictated by (CST): Jayme Flood MD on 5/08/2024 at 4:15 PM     Finalized by (CST): Jayme Flood MD on 5/08/2024 at 4:16 PM          Nuclear medicine uptake scan performed:No  Current symptoms:  Unintentional weight loss:, none, reports weight loss when she is ill and wit changes in diet   Heat intolerance/ diaphoresis: Sweaty palms, cold hands at rest from time to time    Palpitations/SOB/ chest pain: SOB + palpitations when she is anxious  Fatigue:yes  Mood: has underlying anxiety at baseline, Anxiety, insomnia  Diarrhea/ frequent bowel movements: No  Menstrual cycle: used to take plan B, hx of irregular cycles in the past, amenorrhea  starting 12/2024  Skin/hair/nail changes: hair loss +  Eye symptoms if any:None  Neck swelling:No, reports burning sensation around mouth and neck  Compressive symptoms: No Dysphagia, voice changes, SOB  Family history of thyroid disorders:None  Exposure to XRT to head and neck or ionizing radiation in childhood:no  Recent steroids,contrast exposure/amiodarone/ immune therapy/biotin/herbal supplements: Iron, MVI, ashwagandha for stress( started 2-3 weeks ago, per  tic tavia, using the franck brand)  Recent vaccines/viral symptoms: Viral URI in 12/2024   Pregnancy plans if applicable: None    3/25/25 interval hx:   Stopped ashwagandha after 2/2025 visit   Patient continues to report palpitations, tremors, anxiety, hair loss and amenorrhea   Labs c/w mild subclinical hyperthyroid and mild prolactin elevation  Thyroid US stable compared to 5/2024   Patient also reports spontaneously galactorrhea on and off for years, started amitryptiline for GI issues mid March 2025  Denies pregnancy, no sexually active in the last few months    4/7/25 interval hx :   Patient completed labs 3/27, still in process [facility]  4/7/25 labs:   Total testosterone 30 ( < 45) free testosterone 4.8 ( < 5)  bioavailable testosterone 9.9 ( < 8.5)   GFR > 60., AST/ALT WNL  FSH 6.2 LH 14.5, estradiol 28, DHEAS mildly elevated 288 ( < 286)  Prolactin and with dilution 15 ( < 30)   BHCG negative ( urine)   Continues to report amenorrhea starting 4/2025, and spontaneous galactorrhea, states she has stopped nipple stimulation  Her major concern is hair loss  Reports she has been on 25 mg metoprolol for 2 weeks, has not noted any improvement  Scheduled for thyroid uptake scan 4/9/25         ROS      Review of Systems   Constitutional:  Positive for fatigue. Negative for activity change, appetite change, diaphoresis and unexpected weight change.   HENT:  Negative for sore throat, trouble swallowing and voice change.    Eyes:  Negative for photophobia and  visual disturbance.   Respiratory:  Positive for shortness of breath. Negative for cough and choking.    Cardiovascular:  Positive for palpitations. Negative for chest pain and leg swelling.   Gastrointestinal:  Positive for nausea. Negative for constipation, diarrhea and vomiting.   Endocrine: Negative for cold intolerance, heat intolerance, polydipsia, polyphagia and polyuria.   Genitourinary:  Positive for menstrual problem. Negative for frequency.   Musculoskeletal:  Negative for arthralgias, back pain and myalgias.   Skin:  Negative for rash.        Hair loss +   Neurological:  Negative for dizziness, tremors, seizures, syncope, weakness, light-headedness, numbness and headaches.   Hematological:  Does not bruise/bleed easily.   Psychiatric/Behavioral:  Positive for sleep disturbance. Negative for decreased concentration and dysphoric mood. The patient is nervous/anxious.          Past Medical History:    On Accutane therapy         History reviewed. No pertinent surgical history.      Current Outpatient Medications   Medication Sig Dispense Refill    polyethylene glycol, PEG 3350, 17 GM/SCOOP Oral Powder Take 17 g by mouth daily. 238 g 5    metoprolol succinate ER 25 MG Oral Tablet 24 Hr Take 1 tablet (25 mg total) by mouth daily. 90 tablet 1    amitriptyline 10 MG Oral Tab Take by mouth.      Iron Carbonyl-Vitamin C-FOS (CHEWABLE IRON) 30-10-25 MG Oral Chew Tab Chew by mouth. iron gummies 2 by mouth      mupirocin 2 % External Ointment Apply 1 Application topically 3 (three) times daily. 30 g 0    dicyclomine 10 MG Oral Cap Take 1-2 capsules (10-20 mg total) by mouth 4 (four) times daily as needed.      Fiber Adult Gummies 2 g Oral Chew Tab Chew 2 tablets by mouth at bedtime. Buy over-the-counter. 180 tablet 3    docusate sodium 100 MG Oral Tab Take 1 tablet (100 mg total) by mouth 2 (two) times daily as needed for constipation. BUY OTC if not covered- compare to \" Colace\" name brand at local pharmacy 60  tablet 0    Omeprazole 40 MG Oral Capsule Delayed Release TAKE 1 CAPSULE BY MOUTH DAILY 30 MINUTES BEFORE FOOD OR OTHER MEDS           Allergies   Allergen Reactions    Doxycycline OTHER (SEE COMMENTS)     patient does not was this medication do to expressing headaches and abdomen pain    Metronidazole OTHER (SEE COMMENTS)     patient does not was this medication do to expressing headaches and abdomen pain    Penicillin G RASH         Social History     Socioeconomic History    Marital status: Single   Tobacco Use    Smoking status: Never     Passive exposure: Never    Smokeless tobacco: Never   Vaping Use    Vaping status: Never Used   Substance and Sexual Activity    Alcohol use: Never    Drug use: Never   Other Topics Concern    Caffeine Concern No    Exercise No    Seat Belt No    Special Diet No    Stress Concern No    Weight Concern No     Social Drivers of Health     Food Insecurity: No Food Insecurity (1/24/2025)    NCSS - Food Insecurity     Worried About Running Out of Food in the Last Year: No     Ran Out of Food in the Last Year: No   Transportation Needs: No Transportation Needs (1/24/2025)    NCSS - Transportation     Lack of Transportation: No   Housing Stability: Not At Risk (1/24/2025)    NCSS - Housing/Utilities     Has Housing: Yes     Worried About Losing Housing: No     Unable to Get Utilities: No         Family History   Problem Relation Age of Onset    No Known Problems Mother     No Known Problems Father     No Known Problems Sister     Other (autism) Brother     High Cholesterol Maternal Grandmother     Hypertension Maternal Grandfather     Heart Attack Maternal Grandfather 65    Other (lung cancer age 57, smoker) Paternal Grandmother     Cancer Paternal Grandfather         esophageal    Diabetes Paternal Grandfather            PHYSICAL EXAM:      LMP 12/02/2024 (Exact Date)       Wt Readings from Last 3 Encounters:   03/28/25 109 lb (49.4 kg) (14%, Z= -1.09)*   03/25/25 103 lb (46.7 kg)  (6%, Z= -1.55)*   02/25/25 100 lb 9.6 oz (45.6 kg) (4%, Z= -1.75)*     * Growth percentiles are based on CDC (Girls, 2-20 Years) data.           GEN: NAD, a/o x 3  HEENT: normocephalic, atraumatic   Resp: breathing comfortably without use of accessory muscles   CV: no LE edema, pt reported  Abd: soft per pt, non tender to self palpation  Neuro: speech coherent  Psych: euthymic       LABS:See HPI                IMAGING REVIEWED : See HPI        ASSESSMENT/PLAN:      Diagnoses and all orders for this visit:    Subclinical hyperthyroidism  Patient is clinically hyperthyroid, unclear if symptoms are related to thyroid or underlying anxiety  Discussed burning in neck and mouth is not related to the thyroid function or nodules, discuss with PCP re further work up  Biochemical evaluation demonstrates subclinical hyperthyroid in 1/2025-> 3/2025 labs obtained after stopping ashwagandha for 4 weeks showed subclinical hyperthyroidism  5/2024 TPO, TG antibodies were negative-> 3/2025 ab TSI, TRAB negative   3/2025 thyroid US  stable compared to 5/2024 US, with subcentimeter TR2 nodules not requiring FNA or US follow up   Differential etiology includes  thyroiditis  Patient had hx  taking ashwagandha around the time of labs in 1/2025, , which can worsen hyperthyroidism, this persisted on repeat labs in 3/2025 after stopping ashwagandha   Secondary causes of low TSH including pregnancy, use of levothyroxine suppressive doses, glucocorticoids, opioids ruled out.   No history of recent illness that could cause sick euthyroid syndrome  We discussed hyperthyroidism in detail, including symptoms, pathophysiology, diagnosis, treatment options, and monitoring through blood tests  Will obtain nuclear medicine uptake scan to differentiate toxic nodules vs Graves' disease if antibodies are negative for Graves disease and labs continue to reflect overactive thyroid  We discussed indications for treatment of subclinical hyperthyroidism per  latest MARIO guidelines  - treatment criteria: TSH < 0.1, TSH 0.1 to 0.5 with age > 65, osteoporosis, atrial fibrillation, hyperthyroid symptoms   -At this time, patient is not meeting indications for antithyroid Rx, risks of Rx outweigh benefits   Offered  betablocker for symptomatic improvement, pros and cons discussed, patient opted is amenable at 3/2025   -Continue metoprolol 25 mg extended release once daily with breakfast, consider dose titration if no improvement in symptoms after 4 weeks of Rx  -Update TFTs in 3 months [June 2025]    Multinodular goiter  Compressive symptoms: None, at 2/2025 visit we discussed that burning sensation around mouth and neck areas is likely not related to thyroid  Last TSH 1/2025 c/w subclinical hyperthyroid-> 3/2025 per above  Thyroid US from 5/2024, showed subcentimeter nodules in the right and left lobe, FNA criteria not met at the time-> 3/2025 thyroid US per above-> FNA is not indicated at this time  Reviewed in detail risk factors, pathophysiology, natural history and complications of thyroid nodules  Pt has no high-risk factors for thyroid cancer (fam hx of thyroid cancer in 1st degree relative, h/o head/neck irradiation, exposure to ionizing radiation as child, or prior h/o thyroid cancer).       Amenorrhea:   LMP 12/2024  Denies pregnancy, hx of plan B use frequently  in the past , stopped 9/2024  3/2025 TFTs per above , management per above  3/2025 labs with mild prolactin elevation 30.1  Advised to avoid nipple stimulation before lab work  Patient reports acne, significant hair fall, denies hirsutism ,  bloating  Labs drawn 3/27/25 to exclude PCOS, CAH, hyperprolactinemia, and pregnancy still pending, appreciate RN assistance with requesting from [facility]  The patient has upcoming appointment with OB/GYN in April 2025  We discussed that if hormonal evaluation returns within normal range, defer to OB/GYN regarding further evaluation/management of  amenorrhea    Hyperprolactinemia  Mild hyperprolactinemia noted on labs in 3/2025  2/2025 prolactin 30.1 [< 30 )  Symptoms: Irregular cycles/ amenorrhea, reports spontaneous galactorrhea, no immediate pregnancy plans  We discuss differential diagnosis, complications and treatment options if hyperprolactinemia persists at follow up     Hair loss :   Multifactorial  Follow labs for PCOS, elevated DHEAS,testosterone and CAH per above  We discussed that at this time, risk of Rx for overactive thyroid outweighs benefits  If hormonal evaluation is unremarkable, advised she may benefit from dermatology follow up   No follow-ups on file.     F/u TBD pending labs      Thank you for allowing me to participate in the care of your patient. Please call if you have any questions or concerns.    The above plan was discussed in detail with the patient who verbalized understanding and agreement.      A total of 43  minutes was spent today on obtaining history, reviewing pertinent labs, reviewing relevant pathophysiology with patient, reviewing outside records, evaluating patient, providing multiple treatment options, completing documentation and orders, and communicating with other providers as appropriate.     Ness Alvarez MD  [facility]       In reviewing this note, please be advised that Dragon Voice Recognition software used to dictate the note may have made errors in recognizing some of the words or phrases.     Note to patient: The 21 Century Cures Act makes medical notes like these available to patients in the interest of transparency. However, be advised this is a medical document. It is intended as peer to peer communication. It is written in medical language and may contain abbreviations or verbiage that are unfamiliar. It may appear blunt or direct. Medical documents are intended to carry relevant information, facts as evident, and the clinical opinion of the practitioner.

## 2025-04-08 ENCOUNTER — MED REC SCAN ONLY (OUTPATIENT)
Facility: CLINIC | Age: 20
End: 2025-04-08

## 2025-04-08 ENCOUNTER — TELEPHONE (OUTPATIENT)
Facility: CLINIC | Age: 20
End: 2025-04-08

## 2025-04-08 NOTE — TELEPHONE ENCOUNTER
4/8/25-[facility] for NM of the thyroid effective 4/7/25-5/22/25.  Sent to scan.

## 2025-04-09 ENCOUNTER — HOSPITAL ENCOUNTER (OUTPATIENT)
Dept: NUCLEAR MEDICINE | Facility: HOSPITAL | Age: 20
Discharge: HOME | End: 2025-04-09
Attending: INTERNAL MEDICINE
Payer: MEDICAID

## 2025-04-09 ENCOUNTER — PATIENT MESSAGE (OUTPATIENT)
Facility: CLINIC | Age: 20
End: 2025-04-09

## 2025-04-09 DIAGNOSIS — E22.1 HYPERPROLACTINEMIA (HCC): ICD-10-CM

## 2025-04-09 DIAGNOSIS — E05.90 SUBCLINICAL HYPERTHYROIDISM: ICD-10-CM

## 2025-04-09 DIAGNOSIS — N91.2 AMENORRHEA: ICD-10-CM

## 2025-04-09 PROCEDURE — 78014 THYROID IMAGING W/BLOOD FLOW: CPT | Performed by: INTERNAL MEDICINE

## 2025-04-09 NOTE — PROGRESS NOTES
Results released to [facility]. If not reviewed within 7 days, will go to the result pool for staff to follow up

## 2025-04-10 ENCOUNTER — PATIENT MESSAGE (OUTPATIENT)
Facility: CLINIC | Age: 20
End: 2025-04-10

## 2025-04-10 ENCOUNTER — TELEPHONE (OUTPATIENT)
Facility: CLINIC | Age: 20
End: 2025-04-10

## 2025-04-10 LAB
17 HYDROXYPROGESTERONE,$/MS/MS: 54 NG/DL
ALBUMIN/GLOBULIN RATIO: 1.6 (CALC) (ref 1–2.5)
ALBUMIN: 4.5 G/DL (ref 3.6–5.1)
ALBUMIN: 4.7 G/DL (ref 3.6–5.1)
ALKALINE PHOSPHATASE: 88 U/L (ref 36–128)
ALT: 23 U/L (ref 5–32)
AST: 22 U/L (ref 12–32)
BILIRUBIN, TOTAL: 0.5 MG/DL (ref 0.2–1.1)
BUN: 8 MG/DL (ref 7–20)
CALCIUM: 9.6 MG/DL (ref 8.9–10.4)
CARBON DIOXIDE: 29 MMOL/L (ref 20–32)
CHLORIDE: 103 MMOL/L (ref 98–110)
CREATININE: 0.55 MG/DL (ref 0.5–0.96)
DHEA SULFATE: 288 MCG/DL (ref 44–286)
EGFR: 135 ML/MIN/1.73M2
ESTRADIOL: 28 PG/ML
FSH: 6.2 MIU/ML
GLOBULIN: 2.9 G/DL (CALC) (ref 2–3.8)
GLUCOSE: 80 MG/DL (ref 65–99)
HCG, QL, URINE: NEGATIVE
LH: 14.5 MIU/ML
Lab: 15 NG/ML
POTASSIUM: 3.9 MMOL/L (ref 3.8–5.1)
PROTEIN, TOTAL: 7.6 G/DL (ref 6.3–8.2)
SEX HORMONE BINDING$GLOBULIN: 21.3 NMOL/L (ref 17–124)
SODIUM: 138 MMOL/L (ref 135–146)
TESTOSTERONE, FREE: 4.8 PG/ML (ref 0.2–5)
TESTOSTERONE, TOTAL,$/MS/MS: 30 NG/DL (ref 2–45)
TESTOSTERONE,BIOAVAILABLE: 9.9 NG/DL (ref 0.5–8.5)

## 2025-04-10 RX ORDER — NORETHINDRONE ACETATE AND ETHINYL ESTRADIOL .03; 1.5 MG/1; MG/1
1 TABLET ORAL DAILY
Qty: 28 TABLET | Refills: 5 | Status: SHIPPED | OUTPATIENT
Start: 2025-04-10 | End: 2026-04-10

## 2025-04-10 NOTE — TELEPHONE ENCOUNTER
Hello,     Could you please offer a virtual follow up visit in the second or third week of June to this patient?     Thank you!

## 2025-04-10 NOTE — TELEPHONE ENCOUNTER
04/10/2025, patient called in per conversation with Dr. Alvarez today, Patient decided she will take the birth control and the supplement that  recommended.

## 2025-04-10 NOTE — TELEPHONE ENCOUNTER
Hello,     Could you please let the patient know Rx for birth control has been sent to [facility] on file. She may start taking this ASAP. The supplement is OTC, insurance typically does not reimburse for it and my previous my chart message has the names for a couple of preferred brands    Thank you!

## 2025-04-10 NOTE — TELEPHONE ENCOUNTER
Phoned patient  Reviewed partially received labs from [facility].  17 OHP levels are pending  Symptoms: Secondary amenorrhea  Briefly labs with LH : FSH > 2:1, mild DHEAS elevation, normal prolactin  Total testosterone and free WNL, high normal, bioavailable testosterone mildly elevated  Pregnancy test negative  Discussed dx, complications, pathophysiology, management   Patient is not planning pregnancy at this time and has normal BMI  Reviewed definition, pathophysiology and complications of PCOS  Reviewed criteria for dx per below  Diagnostic criteria for PCOS.  [place] criteria, 2/3 required:  Oligo- and/or anovulation  Clinical and/or biochemical signs of hyperandrogenism  Polycystic ovaries (by ultrasound)  -Metformin to reduce insulin resistance and potential for 5 % weight loss as side effect, discussed side effects  -Myoinositol OTC  May option for metformin or myoinositol given mild lab abnormalities  - for anovulation/oligomenorrhea: OCP, excluded contraindications, reviewed pros and cons.   Patient to see OBGYN 4/15, advised to keep visit to review alternative hormonal birth control options ( Mirena/ deport/ nuvaring)  - hyperandrogenic symptoms: If symptoms don't improve with OCP, consider adding aldactone    Patient to review dx and treatment options with mother and will inform us of her decision  Information for birth control and myoinositol sent via my chart per patient request

## 2025-04-11 NOTE — TELEPHONE ENCOUNTER
Received call from patient regarding new birth control prescription.    Patient wondering how she should be taking this, RN reviewed this is a daily oral tablet. Should be taken same time every day, whatever works best for her routine.    Patient wondering if she should take this in the morning and not take with her evening Amitriptyline. RN reviewed that patient can call pharmacy and review this with pharmacist as Provider is no longer in office.    Patient to keep her 4/15 appointment.    Routed for review.

## 2025-04-15 ENCOUNTER — OFFICE VISIT (OUTPATIENT)
Dept: OBGYN CLINIC | Facility: CLINIC | Age: 20
End: 2025-04-15
Payer: MEDICAID

## 2025-04-15 VITALS
HEIGHT: 61 IN | SYSTOLIC BLOOD PRESSURE: 102 MMHG | HEART RATE: 94 BPM | WEIGHT: 107.5 LBS | BODY MASS INDEX: 20.3 KG/M2 | DIASTOLIC BLOOD PRESSURE: 64 MMHG

## 2025-04-15 DIAGNOSIS — R10.2 PELVIC PAIN: ICD-10-CM

## 2025-04-15 DIAGNOSIS — N91.2 AMENORRHEA: ICD-10-CM

## 2025-04-15 DIAGNOSIS — Z13.1 SCREENING FOR DIABETES MELLITUS: ICD-10-CM

## 2025-04-15 DIAGNOSIS — E28.2 PCOS (POLYCYSTIC OVARIAN SYNDROME): ICD-10-CM

## 2025-04-15 DIAGNOSIS — Z13.220 SCREENING, LIPID: ICD-10-CM

## 2025-04-15 DIAGNOSIS — N89.8 VAGINAL DISCHARGE: Primary | ICD-10-CM

## 2025-04-15 DIAGNOSIS — Z11.3 SCREEN FOR STD (SEXUALLY TRANSMITTED DISEASE): ICD-10-CM

## 2025-04-15 PROCEDURE — 81514 NFCT DS BV&VAGINITIS DNA ALG: CPT | Performed by: NURSE PRACTITIONER

## 2025-04-15 PROCEDURE — 87591 N.GONORRHOEAE DNA AMP PROB: CPT | Performed by: NURSE PRACTITIONER

## 2025-04-15 PROCEDURE — 99204 OFFICE O/P NEW MOD 45 MIN: CPT | Performed by: NURSE PRACTITIONER

## 2025-04-15 PROCEDURE — 87491 CHLMYD TRACH DNA AMP PROBE: CPT | Performed by: NURSE PRACTITIONER

## 2025-04-15 PROCEDURE — 81025 URINE PREGNANCY TEST: CPT | Performed by: NURSE PRACTITIONER

## 2025-04-15 PROCEDURE — 99459 PELVIC EXAMINATION: CPT | Performed by: NURSE PRACTITIONER

## 2025-04-15 RX ORDER — MEDROXYPROGESTERONE ACETATE 10 MG
10 TABLET ORAL DAILY
Qty: 21 TABLET | Refills: 0 | Status: SHIPPED | OUTPATIENT
Start: 2025-04-15

## 2025-04-15 NOTE — PROGRESS NOTES
Subjective:  19 year old    Chief Complaint   Patient presents with    Menstrual Problem     Patient hasn't gotten a period since December, skips months at a time, was told she has mild PCOS    Vaginal Problem     Has white discharge, pain in lower right abdomen with intercourse     Pt here today, with her mother, with multiple concerns    1) pt notes her LMP 2024  Has history of irregular menses but this the the longest she has been without menses  She was evaluated by PCP and labs showed hyperthyroid and hyperprolactinemia  She was seen by endocrine who furthered her work up and noted \"mild PCOS\"  Pt was given a prescription for OCP but she did not take 2/2 concern for interaction with amitriptyline  Pt has also taken multiple doses of Plan B, most recent in  or 2024    2) pt notes vaginal discharge for several months  + odor  Denies irritation    3) Pt also notes right pelvic pain  [place] aggravates pain  However notes that she also has pain at other times    Review of Systems:  Pertinent items are noted in the HPI.    Objective:  /64   Pulse 94   Ht 61\"   Wt 107 lb 8 oz (48.8 kg)   LMP 2024 (Exact Date)       Physical Examination:  General appearance: Well dressed, well nourished in no apparent distress  Neurologic/Psychiatric: Alert and oriented to person, place and time, mood normal, affect appropriate  Abdomen: Soft, non-tender, non-distended, no masses, no hepatosplenomegaly, no hernias, no inguinal lymphadenopathy  Pelvic:    External genitalia- Normal, Bartholin's, urethra, skeins glands normal   Vagina- No vaginal lesions, + thick white discharge   Cervix- No lesions, long/closed, no cervical motion tenderness   Uterus- Normal, non-tender, no masses   Adnexa-  Non-tender, no masses    Assessment/Plan:        Diagnoses and all orders for this visit:    Vaginal discharge  -     Vaginitis Vaginosis PCR Panel; Future  -     Chlamydia/Gc Amplification  - will treat based on  culture results  - to follow up with new/worsening symptoms or no improvement    Pelvic pain  -     US PELVIS W EV (CPT=76856/71725); Future  - discussed that pelvic may may be 2/2 infection  - we also discussed the pelvic US can evaluate PCOS  - will treat based on US results  - to follow up with new/worsening symptoms or no improvement    Screening for diabetes mellitus  -     Hemoglobin A1C    Screening, lipid  -     Lipid Panel    PCOS (polycystic ovarian syndrome)  - discussed PCOS and treatment of PCOS  - recommend further evaluation for hyperglycemia and hypercholesteremia      -  Lipid Panel  -     Hemoglobin A1C  - we also discussed management of symptoms of OCP as previous prescribed by endocrine  - pt hesitant to start OCP 2/2 amitriptyline     Amenorrhea  -     Urine Preg Test [[id number]] - negative  - discussed endometrial protection  - will trial provera x 3 months  -     medroxyPROGESTERone Acetate (PROVERA) 10 MG Oral Tab; Take 1 tablet (10 mg total) by mouth daily. For 7 days every month  - to call office if no menses within 7 days of taking provera  - to follow up in 3 months     Screen for STD (sexually transmitted disease)  -     Chlamydia/Gc Amplification        Return in about 3 months (around 7/15/2025) for PCOS.

## 2025-04-16 LAB
BV BACTERIA DNA VAG QL NAA+PROBE: NEGATIVE
C GLABRATA DNA VAG QL NAA+PROBE: NEGATIVE
C KRUSEI DNA VAG QL NAA+PROBE: NEGATIVE
C TRACH DNA SPEC QL NAA+PROBE: NEGATIVE
CANDIDA DNA VAG QL NAA+PROBE: NEGATIVE
N GONORRHOEA DNA SPEC QL NAA+PROBE: NEGATIVE
T VAGINALIS DNA VAG QL NAA+PROBE: NEGATIVE

## 2025-06-06 ENCOUNTER — OFFICE VISIT (OUTPATIENT)
Dept: FAMILY MEDICINE CLINIC | Facility: CLINIC | Age: 20
End: 2025-06-06
Payer: MEDICAID

## 2025-06-06 VITALS
BODY MASS INDEX: 22.43 KG/M2 | HEIGHT: 61 IN | TEMPERATURE: 98 F | SYSTOLIC BLOOD PRESSURE: 106 MMHG | RESPIRATION RATE: 18 BRPM | OXYGEN SATURATION: 98 % | DIASTOLIC BLOOD PRESSURE: 60 MMHG | WEIGHT: 118.81 LBS | HEART RATE: 117 BPM

## 2025-06-06 DIAGNOSIS — J02.9 PHARYNGITIS, UNSPECIFIED ETIOLOGY: Primary | ICD-10-CM

## 2025-06-06 DIAGNOSIS — E28.2 PCOS (POLYCYSTIC OVARIAN SYNDROME): ICD-10-CM

## 2025-06-06 DIAGNOSIS — D50.0 IRON DEFICIENCY ANEMIA DUE TO CHRONIC BLOOD LOSS: ICD-10-CM

## 2025-06-06 DIAGNOSIS — E05.90 SUBCLINICAL HYPERTHYROIDISM: ICD-10-CM

## 2025-06-06 LAB
CONTROL LINE PRESENT WITH A CLEAR BACKGROUND (YES/NO): YES YES/NO
COVID19 BINAX NOW ANTIGEN: NOT DETECTED
KIT LOT #: NORMAL NUMERIC
OPERATOR ID: NORMAL
STREP GRP A CUL-SCR: NEGATIVE

## 2025-06-06 PROCEDURE — 99214 OFFICE O/P EST MOD 30 MIN: CPT | Performed by: FAMILY MEDICINE

## 2025-06-06 PROCEDURE — 87880 STREP A ASSAY W/OPTIC: CPT | Performed by: FAMILY MEDICINE

## 2025-06-06 RX ORDER — AZITHROMYCIN 250 MG/1
TABLET, FILM COATED ORAL
Qty: 6 TABLET | Refills: 0 | Status: SHIPPED | OUTPATIENT
Start: 2025-06-06 | End: 2025-06-11

## 2025-06-06 NOTE — PROGRESS NOTES
Subjective:   Sandy Hare is a 19 year old female who presents for Follow - Up (Anemia discuss labs ) and Sore Throat (X 1 week )       History/Other:   History of Present Illness  Miss Sandy Hare is a 19 year old female who presents with a sore throat.    She has experienced a sore throat for approximately four days, with white spots and small red dots on her throat. There is a sensation of something getting stuck when swallowing, although water does not get stuck. The sore throat is worse in the morning, improves throughout the day, and slightly worsens again at night. No runny nose, cough, or fever, but there is a congested feeling in the nose and a tickling sensation in the back of the throat.    She has a history of anemia and is currently taking iron gummies, which contain 30 mg of iron each, taken twice daily. She has not completed recent lab work for her anemia due to losing the paperwork, but plans to have it done soon.    She was recently diagnosed with polycystic ovary syndrome (PCOS) following a comprehensive hormonal workup. She experiences pain during sexual intercourse, which she associates with potential ovarian cysts and has an upcoming pelvic ultrasound ordered by OB/gyn. She is on amitriptyline which interferes with OCP's so considering IUD mirena.    She has a history of thyroid issues, with previous findings of small nodules and abnormal thyroid levels. She has not been prescribed medication for her thyroid condition as it was deemed unnecessary at this time. She has not had a follow-up appointment since her initial workup, but her endocrinologist has communicated results over the phone.   Chief Complaint Reviewed and Verified  Nursing Notes Reviewed and   Verified  Tobacco Reviewed  Allergies Reviewed  Medications Reviewed    Problem List Reviewed  Medical History Reviewed  Surgical History   Reviewed  Family History Reviewed         Tobacco:  She has never smoked  tobacco.    Current Medications[1]      Review of Systems:  Review of Systems  See pertinent positives in HPI    Objective:   /60 (BP Location: Left arm, Patient Position: Sitting, Cuff Size: adult)   Pulse 117   Temp 98.1 °F (36.7 °C) (Temporal)   Resp 18   Ht 5' 1\" (1.549 m)   Wt 118 lb 12.8 oz (53.9 kg)   LMP 12/02/2024 (Exact Date)   SpO2 98%   BMI 22.45 kg/m²  Estimated body mass index is 22.45 kg/m² as calculated from the following:    Height as of this encounter: 5' 1\" (1.549 m).    Weight as of this encounter: 118 lb 12.8 oz (53.9 kg).  Physical Exam  General: Well-nourished, well hydrated. No acute distress. No pallor. No tachypnea. No stridor.   HEENT: normocephaly, atraumatic. Sclera clear and non icteric bilaterally. Bilateral intact tympanic membranes without fluid and redness. Good cone of light bilaterally. Symmetric tonsils 1-2+ size and left tonsillar crypt with  exudates only. Moist mucous membranes. Cobblestoning present. Uvula midline. Red nasal turbinates  Neck: supple. No adenopathy.   Heart: RRR without S3 or S4 murmur. Clear S1S2  Lungs: clear to auscultation bilaterally. No rales, rhonchi or wheezes. Good inspiratory and expiratory effort. No costosternal retractions.  Abdomen: soft, nontender, nondistended.  Extremities: No cyanosis, clubbing, or edema bilaterally   Skin: no acute  rashes    Results  Procedure: Throat Swab  Description: Swab inserted into the throat. White spots observed on the tonsils.    Procedure: Nasal Swab  Description: Swab inserted into each nostril with five swirls. Patient experienced discomfort and burning sensation.    LABS  Thyroid levels: abnormal  Hormonal blood work: indicative of PCOS  Strep swab: negative (06/06/2025)    RADIOLOGY  Nuclear medicine scan: small nodules    No visits with results within 1 Month(s) from this visit.   Latest known visit with results is:   Office Visit on 04/15/2025   Component Date Value    Chlamydia Trachomatis  Am* 04/15/2025 Negative     Neisseria Gonorrhoeae Am* 04/15/2025 Negative     Chlam/GC Source 04/15/2025 Cervical/endocervical     Pregnancy Test, Urine 04/15/2025 Negative     Control Line Present wit* 04/15/2025 Yes     Kit Lot # 04/15/2025 [id number]     Kit Expiration Date 04/15/2025 09/09/2026     Bacterial Vaginosis 04/15/2025 Negative     Candida group 04/15/2025 Negative     Nakaseomyces glabrata (C* 04/15/2025 Negative     Pichia kudriavzevii (Can* 04/15/2025 Negative     Trichomonas vaginalis 04/15/2025 Negative            Assessment & Plan:   1. Pharyngitis, unspecified etiology (Primary)  -     Azithromycin; Take 2 tablets (500 mg total) by mouth daily for 1 day, THEN 1 tablet (250 mg total) daily for 4 days.  Dispense: 6 tablet; Refill: 0  -     Strep A Assay W/Optic- negative  No strep exposure  No other symptoms. Difficult strep test. Will treat empirically  2. Iron deficiency anemia due to chronic blood loss  3. Subclinical hyperthyroidism  4. PCOS (polycystic ovarian syndrome)  Other orders  -     COVID19 BinaxNOW Antigen    Assessment & Plan  Pharyngitis  Presents with a sore throat for four to five days, with white spots and red dots in the throat. No fever, runny nose, or cough, but some nasal congestion and a tickling sensation in the throat. Can swallow, but it feels like it gets stuck. Sore throat is worse in the morning and improves throughout the day, with slight worsening at night. Examination reveals white spots in the throat, but no significant tonsillar enlargement. Differential diagnosis includes viral pharyngitis, postnasal drip, and less likely streptococcal pharyngitis or mononucleosis. Strep test is negative, and COVID test is pending. Informed about the possibility of mono, but symptoms do not fully align.  - Perform COVID test  - Advise salt water gargles  - Recommend acetaminophen for pain if needed  - Advise guaifenesin/dextromethorphan if rhinorrhea develops  - Schedule follow-up  in one week if symptoms do not improve    Polycystic Ovary Syndrome (PCOS)  Diagnosed with PCOS by an endocrinologist, with hormonal blood tests confirming the diagnosis. Experiences dyspareunia, possibly related to ovarian cysts. Concerned about fertility issues associated with PCOS. Endocrinologist recommended oral contraceptives, but amitriptyline may reduce effectiveness. Discussed IUD as a preferred contraceptive option due to its local action and reduced systemic side effects compared to oral contraceptives. Explained that IUD insertion can be uncomfortable but is effective for up to seven years and can be easily removed if pregnancy is desired.  - Advise follow-up with OB/GYN for contraceptive options  - Discuss IUD as a preferred contraceptive method  - Advise to undergo ultrasound to check for ovarian cysts    Thyroid Nodules  Has thyroid nodules and has undergone a nuclear medicine scan. Endocrinologist has not prescribed medication due to the nodules being small and the potential side effects of treatment. Needs further evaluation to determine if any changes in management are necessary.  - Advise follow-up with endocrinologist for further evaluation and management    Anemia-iron deficiency  Anemia managed with iron gummies, taking 60 mg daily. Has not completed recent lab work to assess current iron levels. Discussed the importance of completing lab work to evaluate current status and adjust treatment if necessary.  - Provide lab order for iron studies  - Advise to complete lab work at [facility]  - consider IUD mirena which will improve anemia and prevent pregnancy      Return in about 1 week (around 6/13/2025), or if symptoms worsen or fail to improve.      Linda Padilla DO, 6/6/2025, 12:53 PM               [1]   Current Outpatient Medications   Medication Sig Dispense Refill    JUNEL FE 1.5/30 1.5-30 MG-MCG Oral Tab Take 1 tablet by mouth daily.      medroxyPROGESTERone Acetate (PROVERA) 10 MG Oral Tab  Take 1 tablet (10 mg total) by mouth daily. For 7 days every month 21 tablet 0    polyethylene glycol, PEG 3350, 17 GM/SCOOP Oral Powder Take 17 g by mouth daily. 238 g 5    metoprolol succinate ER 25 MG Oral Tablet 24 Hr Take 1 tablet (25 mg total) by mouth daily. (Patient not taking: Reported on 4/15/2025) 90 tablet 1    amitriptyline 10 MG Oral Tab Take by mouth.      Iron Carbonyl-Vitamin C-FOS (CHEWABLE IRON) 30-10-25 MG Oral Chew Tab Chew by mouth. iron gummies 2 by mouth      mupirocin 2 % External Ointment Apply 1 Application topically 3 (three) times daily. (Patient not taking: Reported on 4/15/2025) 30 g 0    dicyclomine 10 MG Oral Cap Take 1-2 capsules (10-20 mg total) by mouth 4 (four) times daily as needed.      Fiber Adult Gummies 2 g Oral Chew Tab Chew 2 tablets by mouth at bedtime. Buy over-the-counter. 180 tablet 3    docusate sodium 100 MG Oral Tab Take 1 tablet (100 mg total) by mouth 2 (two) times daily as needed for constipation. BUY OTC if not covered- compare to \" Colace\" name brand at local pharmacy 60 tablet 0    Omeprazole 40 MG Oral Capsule Delayed Release TAKE 1 CAPSULE BY MOUTH DAILY 30 MINUTES BEFORE FOOD OR OTHER MEDS

## 2025-06-06 NOTE — PROGRESS NOTES
The following individual(s) verbally consented to be recorded using ambient AI listening technology and understand that they can each withdraw their consent to this listening technology at any point by asking the clinician to turn off or pause the recording:    Patient name: Sandy Hare  Additional names:  [email or web address]

## 2025-06-08 ENCOUNTER — HOSPITAL ENCOUNTER (OUTPATIENT)
Dept: ULTRASOUND IMAGING | Age: 20
Discharge: HOME | End: 2025-06-08
Attending: NURSE PRACTITIONER
Payer: MEDICAID

## 2025-06-08 ENCOUNTER — HOSPITAL ENCOUNTER (OUTPATIENT)
Dept: ULTRASOUND IMAGING | Age: 20
End: 2025-06-08
Attending: NURSE PRACTITIONER
Payer: MEDICAID

## 2025-06-08 DIAGNOSIS — R10.2 PELVIC PAIN: ICD-10-CM

## 2025-06-08 PROCEDURE — 76856 US EXAM PELVIC COMPLETE: CPT | Performed by: NURSE PRACTITIONER

## 2025-06-08 PROCEDURE — 76830 TRANSVAGINAL US NON-OB: CPT | Performed by: NURSE PRACTITIONER

## 2025-06-23 ENCOUNTER — OFFICE VISIT (OUTPATIENT)
Dept: FAMILY MEDICINE CLINIC | Facility: CLINIC | Age: 20
End: 2025-06-23
Payer: MEDICAID

## 2025-06-23 VITALS
OXYGEN SATURATION: 99 % | DIASTOLIC BLOOD PRESSURE: 56 MMHG | SYSTOLIC BLOOD PRESSURE: 100 MMHG | TEMPERATURE: 99 F | HEIGHT: 61 IN | RESPIRATION RATE: 18 BRPM | BODY MASS INDEX: 23.29 KG/M2 | WEIGHT: 123.38 LBS | HEART RATE: 90 BPM

## 2025-06-23 DIAGNOSIS — R30.0 DYSURIA: ICD-10-CM

## 2025-06-23 DIAGNOSIS — N89.8 VAGINAL DISCHARGE: Primary | ICD-10-CM

## 2025-06-23 LAB
APPEARANCE: CLEAR
BILIRUBIN: NEGATIVE
GLUCOSE (URINE DIPSTICK): NEGATIVE MG/DL
KETONES (URINE DIPSTICK): NEGATIVE MG/DL
MULTISTIX LOT#: ABNORMAL NUMERIC
NITRITE, URINE: NEGATIVE
PH, URINE: 5.5 (ref 4.5–8)
PROTEIN (URINE DIPSTICK): NEGATIVE MG/DL
SPECIFIC GRAVITY: 1.01 (ref 1–1.03)
URINE-COLOR: YELLOW
UROBILINOGEN,SEMI-QN: 0.2 MG/DL (ref 0–1.9)

## 2025-06-23 PROCEDURE — 81514 NFCT DS BV&VAGINITIS DNA ALG: CPT | Performed by: FAMILY MEDICINE

## 2025-06-23 PROCEDURE — 87086 URINE CULTURE/COLONY COUNT: CPT | Performed by: FAMILY MEDICINE

## 2025-06-23 RX ORDER — SULFAMETHOXAZOLE AND TRIMETHOPRIM 800; 160 MG/1; MG/1
1 TABLET ORAL 2 TIMES DAILY
COMMUNITY
Start: 2025-05-28

## 2025-06-23 RX ORDER — FLUCONAZOLE 150 MG/1
TABLET ORAL
COMMUNITY
Start: 2025-06-21

## 2025-06-23 NOTE — PROGRESS NOTES
CHIEF COMPLAINT:   Chief Complaint   Patient presents with    UTI    Vaginal Problem         HPI:     Sandy Hare is a 19 year old female presents for vaginal discharge and dysuria.    HPI  History of Present Illness  Miss Sandy Hare is a 19 year old female who presents with persistent vaginal itching and discharge following treatment for a urinary tract infection.    Four weeks ago, she completed a seven-day course of antibiotics for a urinary tract infection, after which she developed vaginal pruritus and a thick, white discharge. She initially suspected a reaction to soap but sought medical attention when symptoms persisted. Two days ago, she was prescribed two doses of Diflucan, which she completed yesterday, resulting in reduced itching but slight persistent pruritus around the urethra.    She is currently menstruating, which began on the day of her urgent care visit. She is sexually active and notes that tight or sweaty clothing may exacerbate her symptoms. She recently started taking probiotics to maintain vaginal kaiser balance. There is no current dysuria or significant pain in the kidney area.            HISTORY:  Past Medical History[1]   Past Surgical History[2]   Family History[3]   Social History: Short Social Hx on File[4]     Medications (Active prior to today's visit):  Current Medications[5]    Allergies:  Allergies[6]    PSFH elements reviewed from today and agreed except as otherwise stated in HPI.  ROS:     Review of Systems      Pertinent positives and negatives noted in the the HPI.    PHYSICAL EXAM:   /56 (BP Location: Left arm, Patient Position: Sitting, Cuff Size: adult)   Pulse 90   Temp 98.6 °F (37 °C) (Temporal)   Resp 18   Ht 5' 1\" (1.549 m)   Wt 123 lb 6.4 oz (56 kg)   LMP 06/22/2025 (Exact Date)   SpO2 99%   BMI 23.32 kg/m²   Vital signs reviewed.Appears stated age, well groomed.  Physical Exam  Cardiovascular:      Rate and Rhythm: Normal rate and regular  rhythm.      Pulses: Normal pulses.      Heart sounds: Normal heart sounds.   Pulmonary:      Effort: Pulmonary effort is normal.      Breath sounds: Normal breath sounds.   Abdominal:      General: Bowel sounds are normal. There is no distension.      Palpations: Abdomen is soft.      Tenderness: There is no abdominal tenderness. There is no right CVA tenderness, left CVA tenderness or guarding.   Genitourinary:     General: Normal vulva.      Vagina: No vaginal discharge.      Comments: Speculum exam: vaginal mucosa normal, has light bloody discharge. Cervix is closed and has no erythema, is not friable. No white or thick discharge noted.     Bimanual exam: deferred      Neurological:      Mental Status: She is oriented to person, place, and time.   Psychiatric:         Behavior: Behavior normal.          LABS     Office Visit on 06/23/2025   Component Date Value    Glucose Urine 06/23/2025 Negative     Bilirubin Urine 06/23/2025 Negative     Ketones, UA 06/23/2025 Negative     Spec Gravity 06/23/2025 1.015     Blood Urine 06/23/2025 Moderate (A)     PH Urine 06/23/2025 5.5     Protein Urine 06/23/2025 Negative     Urobilinogen Urine 06/23/2025 0.2     Nitrite Urine 06/23/2025 Negative     Leukocyte Esterase Urine 06/23/2025 Small (A)     APPEARANCE 06/23/2025 clear     Color Urine 06/23/2025 yellow     Multistix Lot# 06/23/2025 [id number]     Multistix Expiration Date 06/23/2025 10/31/25    Office Visit on 06/06/2025   Component Date Value    Strep Grp A Screen 06/06/2025 Negative     Control Line Present wit* 06/06/2025 yes     Kit Lot # 06/06/2025 [id number]     Kit Expiration Date 06/06/2025 7-22-25     COVID19 Binax Now Antigen 06/06/2025 Not Detected     POCT Lot Number 06/06/2025 [id number]     POCT Expiration Date 06/06/2025 3/29/26     POCT Procedure Control 06/06/2025 Control Valid      ID 06/06/2025 [id number]       REVIEWED THIS VISIT  ASSESSMENT/PLAN:   19 year old female with    1. Vaginal discharge  -  was treated with 2 doses of Diflucan per [facility] clinic on 6/21, sx improved  - will confirm type of infection, if any, on vaginal swab today    - Vaginitis Vaginosis PCR Panel; Future  - Vaginitis Vaginosis PCR Panel    2. Dysuria  - UA: + blood and + leuk est, send for Cx results  - advised to drink plenty of water for now  - URINALYSIS, AUTO, W/O SCOPE  - Urine Culture, Routine [E]     The patient and provider have a longitudinal relationship to address/treat the serious or   complex condition(s) as stated in this encounter.     Meds This Visit:  Requested Prescriptions      No prescriptions requested or ordered in this encounter       Health Maintenance:  Health Maintenance   Topic Date Due    COVID-19 Vaccine (4 - 2024-25 season) 09/01/2024    Meningococcal B Vaccine (1 of 2 - Standard) 01/24/2026 (Originally 9/11/2021)    DTaP,Tdap,and Td Vaccines (7 - Td or Tdap) 12/25/2025    Annual Physical  01/24/2026    Influenza Vaccine  Completed    Annual Depression Screening  Completed    Hepatitis B Vaccines  Completed    Hepatitis A Vaccines  Completed    MMR Vaccines  Completed    Varicella Vaccines  Completed    Meningococcal Vaccine  Completed    HPV Vaccines  Completed    Pneumococcal Vaccine: Birth to 50yrs  Aged Out         Patient/Caregiver Education: There are no barriers to learning. Medical education done.   Outcome: Patient verbalizes understanding and agrees with plan. Advised to call or RTC if symptoms persist or worsen.    Problem List:   Problem List[7]    Imaging & Referrals:  None     6/23/2025  Karina Adan MD      Patient understands plan and follow-up.  Return for follow-up depending on lab results.              [1]   Past Medical History:   On Accutane therapy   [2] No past surgical history on file.  [3]   Family History  Problem Relation Age of Onset    No Known Problems Mother     No Known Problems Father     No Known Problems Sister     Other (autism) Brother     High Cholesterol  Maternal Grandmother     Hypertension Maternal Grandfather     Heart Attack Maternal Grandfather 65    Other (lung cancer age 57, smoker) Paternal Grandmother     Cancer Paternal Grandfather         esophageal    Diabetes Paternal Grandfather    [4]   Social History  Socioeconomic History    Marital status: Single   Tobacco Use    Smoking status: Never     Passive exposure: Never    Smokeless tobacco: Never   Vaping Use    Vaping status: Never Used   Substance and Sexual Activity    Alcohol use: Never    Drug use: Never    Sexual activity: Not Currently   Other Topics Concern    Caffeine Concern No    Exercise No    Seat Belt No    Special Diet No    Stress Concern No    Weight Concern No     Social Drivers of Health     Food Insecurity: No Food Insecurity (1/24/2025)    NCSS - Food Insecurity     Worried About Running Out of Food in the Last Year: No     Ran Out of Food in the Last Year: No   Transportation Needs: No Transportation Needs (1/24/2025)    NCSS - Transportation     Lack of Transportation: No   Housing Stability: Not At Risk (1/24/2025)    NCSS - Housing/Utilities     Has Housing: Yes     Worried About Losing Housing: No     Unable to Get Utilities: No   [5]   Current Outpatient Medications   Medication Sig Dispense Refill    fluconazole 150 MG Oral Tab TAKE 1 TABLET BY MOUTH 1 TIME FOR 1 DOSE. IF NO IMPROVEMENT IN 72 HOURS TAKE 2 ND DOSE      sulfamethoxazole-trimethoprim -160 MG Oral Tab per tablet Take 1 tablet by mouth 2 (two) times daily.      medroxyPROGESTERone Acetate (PROVERA) 10 MG Oral Tab Take 1 tablet (10 mg total) by mouth daily. For 7 days every month 21 tablet 0    polyethylene glycol, PEG 3350, 17 GM/SCOOP Oral Powder Take 17 g by mouth daily. 238 g 5    metoprolol succinate ER 25 MG Oral Tablet 24 Hr Take 1 tablet (25 mg total) by mouth daily. (Patient not taking: Reported on 4/15/2025) 90 tablet 1    amitriptyline 10 MG Oral Tab Take by mouth.      Iron Carbonyl-Vitamin C-FOS  (CHEWABLE IRON) 30-10-25 MG Oral Chew Tab Chew by mouth. iron gummies 2 by mouth      mupirocin 2 % External Ointment Apply 1 Application topically 3 (three) times daily. (Patient not taking: Reported on 4/15/2025) 30 g 0    Fiber Adult Gummies 2 g Oral Chew Tab Chew 2 tablets by mouth at bedtime. Buy over-the-counter. 180 tablet 3    docusate sodium 100 MG Oral Tab Take 1 tablet (100 mg total) by mouth 2 (two) times daily as needed for constipation. BUY OTC if not covered- compare to \" Colace\" name brand at local pharmacy 60 tablet 0    Omeprazole 40 MG Oral Capsule Delayed Release TAKE 1 CAPSULE BY MOUTH DAILY 30 MINUTES BEFORE FOOD OR OTHER MEDS     [6]   Allergies  Allergen Reactions    Doxycycline OTHER (SEE COMMENTS)     patient does not was this medication do to expressing headaches and abdomen pain    Metronidazole OTHER (SEE COMMENTS)     patient does not was this medication do to expressing headaches and abdomen pain    Penicillin G RASH   [7]   Patient Active Problem List  Diagnosis    Dysmenorrhea in adolescent    Anxiety    Reading impairment    Iron deficiency anemia due to chronic blood loss    Gastritis    History of PID    CANDIDO (generalized anxiety disorder)    Chronic idiopathic constipation    Low TSH level    Subclinical hyperthyroidism    Multinodular goiter    Late menses    Functional gastrointestinal symptoms    Amenorrhea    Facial lesion    Hyperprolactinemia (HCC)    Hemangioma of skin    Hair loss    Pharyngitis    PCOS (polycystic ovarian syndrome)

## 2025-06-24 LAB
BV BACTERIA DNA VAG QL NAA+PROBE: NEGATIVE
C GLABRATA DNA VAG QL NAA+PROBE: NEGATIVE
C KRUSEI DNA VAG QL NAA+PROBE: NEGATIVE
CANDIDA DNA VAG QL NAA+PROBE: NEGATIVE
T VAGINALIS DNA VAG QL NAA+PROBE: NEGATIVE

## 2025-07-09 DIAGNOSIS — N91.2 AMENORRHEA: ICD-10-CM

## 2025-07-11 RX ORDER — MEDROXYPROGESTERONE ACETATE 10 MG
10 TABLET ORAL DAILY
Qty: 21 TABLET | Refills: 0 | Status: SHIPPED | OUTPATIENT
Start: 2025-07-11

## 2025-07-16 ENCOUNTER — OFFICE VISIT (OUTPATIENT)
Dept: FAMILY MEDICINE CLINIC | Facility: CLINIC | Age: 20
End: 2025-07-16
Payer: MEDICAID

## 2025-07-16 VITALS
HEIGHT: 61 IN | SYSTOLIC BLOOD PRESSURE: 100 MMHG | DIASTOLIC BLOOD PRESSURE: 62 MMHG | HEART RATE: 99 BPM | TEMPERATURE: 98 F | BODY MASS INDEX: 23.83 KG/M2 | OXYGEN SATURATION: 99 % | WEIGHT: 126.19 LBS | RESPIRATION RATE: 18 BRPM

## 2025-07-16 DIAGNOSIS — J02.9 ACUTE PHARYNGITIS, UNSPECIFIED ETIOLOGY: Primary | ICD-10-CM

## 2025-07-16 DIAGNOSIS — J01.00 ACUTE MAXILLARY SINUSITIS, RECURRENCE NOT SPECIFIED: ICD-10-CM

## 2025-07-16 LAB
CONTROL LINE PRESENT WITH A CLEAR BACKGROUND (YES/NO): YES YES/NO
COVID19 BINAX NOW ANTIGEN: NOT DETECTED
KIT EXPIRATION DATE: NORMAL DATE
KIT LOT #: NORMAL NUMERIC
OPERATOR ID: NORMAL
POCT LOT NUMBER: YES
STREP GRP A CUL-SCR: NEGATIVE

## 2025-07-16 PROCEDURE — 87880 STREP A ASSAY W/OPTIC: CPT | Performed by: FAMILY MEDICINE

## 2025-07-16 PROCEDURE — 99213 OFFICE O/P EST LOW 20 MIN: CPT | Performed by: FAMILY MEDICINE

## 2025-07-16 NOTE — PROGRESS NOTES
CHIEF COMPLAINT:   Chief Complaint   Patient presents with    Sore Throat     X 4 days congestion, cough          HPI:     Sandy Hare is a 19 year old female presents for sore throat.    HPI    History of Present Illness  Miss Sandy Hare is a 19 year old female who presents with a sore throat and upper respiratory symptoms. She is a pt of Dr. Padilla.    She has had a sore throat for four days, initially with a headache, sneezing, and nasal congestion. Nasal congestion worsened, causing difficulty breathing through one nostril. By the third day, she developed a cough and increased nasal discharge, though the headache resolved. She denies fever but felt very hot one day without checking her temperature.    She uses Tylenol for headache relief and cough drops for throat discomfort. She experiences fullness in her ears and coughs up phlegm. No rashes are present.    She works as a  for an [employer] company and left work early due to her symptoms. She is concerned about spreading illness to others and feels very tired, noting exhaustion despite adequate sleep.   No recent travel and no known sick contacts.        HISTORY:  Past Medical History[1]   Past Surgical History[2]   Family History[3]   Social History: Short Social Hx on File[4]     Medications (Active prior to today's visit):  Current Medications[5]    Allergies:  Allergies[6]    PSFH elements reviewed from today and agreed except as otherwise stated in HPI.  ROS:     Review of Systems      Pertinent positives and negatives noted in the the HPI.    PHYSICAL EXAM:   /62 (BP Location: Left arm, Patient Position: Sitting, Cuff Size: adult)   Pulse 99   Temp 98.2 °F (36.8 °C) (Temporal)   Resp 18   Ht 5' 1\" (1.549 m)   Wt 126 lb 3.2 oz (57.2 kg)   LMP 06/22/2025 (Exact Date)   SpO2 99%   BMI 23.85 kg/m²   Vital signs reviewed.Appears stated age, well groomed.  Physical Exam  Vitals reviewed.   Constitutional:       General: She  is not in acute distress.     Appearance: Normal appearance. She is not toxic-appearing.   HENT:      Head: Normocephalic.      Right Ear: Ear canal and external ear normal.      Left Ear: Ear canal and external ear normal.      Ears:      Comments: TM opacity bilaterally  No erythema and not bulging.     Nose: Congestion present. No rhinorrhea.      Mouth/Throat:      Mouth: Mucous membranes are moist.      Pharynx: Oropharynx is clear. Posterior oropharyngeal erythema present. No oropharyngeal exudate.      Comments: +cobblestoning of oropharynx    Cardiovascular:      Rate and Rhythm: Normal rate and regular rhythm.      Pulses: Normal pulses.      Heart sounds: Normal heart sounds.   Pulmonary:      Effort: Pulmonary effort is normal. No respiratory distress.      Breath sounds: Normal breath sounds.   Musculoskeletal:      Cervical back: Normal range of motion and neck supple.      Right lower leg: No edema.      Left lower leg: No edema.   Lymphadenopathy:      Cervical: No cervical adenopathy.   Skin:     General: Skin is warm and dry.   Neurological:      Mental Status: She is alert and oriented to person, place, and time.   Psychiatric:         Behavior: Behavior normal.          LABS     Office Visit on 06/23/2025   Component Date Value    Glucose Urine 06/23/2025 Negative     Bilirubin Urine 06/23/2025 Negative     Ketones, UA 06/23/2025 Negative     Spec Gravity 06/23/2025 1.015     Blood Urine 06/23/2025 Moderate (A)     PH Urine 06/23/2025 5.5     Protein Urine 06/23/2025 Negative     Urobilinogen Urine 06/23/2025 0.2     Nitrite Urine 06/23/2025 Negative     Leukocyte Esterase Urine 06/23/2025 Small (A)     APPEARANCE 06/23/2025 clear     Color Urine 06/23/2025 yellow     Multistix Lot# 06/23/2025 [id number]     Multistix Expiration Date 06/23/2025 10/31/25     Urine Culture 06/23/2025 <10,000 cfu/ml Multiple species present- probable contamination.     Bacterial Vaginosis 06/23/2025 Negative     Candida  group 06/23/2025 Negative     Nakaseomyces glabrata (C* 06/23/2025 Negative     Pichia kudriavzevii (Can* 06/23/2025 Negative     Trichomonas vaginalis 06/23/2025 Negative    Office Visit on 06/06/2025   Component Date Value    Strep Grp A Screen 06/06/2025 Negative     Control Line Present wit* 06/06/2025 yes     Kit Lot # 06/06/2025 [id number]     Kit Expiration Date 06/06/2025 7-22-25     COVID19 Binax Now Antigen 06/06/2025 Not Detected     POCT Lot Number 06/06/2025 [id number]     POCT Expiration Date 06/06/2025 3/29/26     POCT Procedure Control 06/06/2025 Control Valid      ID 06/06/2025 [id number]       REVIEWED THIS VISIT  ASSESSMENT/PLAN:   19 year old female with    1. Acute pharyngitis, unspecified etiology  - Rapid Strep: neg  - Rapid Covid: neg  - reassured pt this is likely a viral infection, may take up to 7-10 days to resolve on its own  - cont supportive care  - if sx worsen or persist, advised to call and I will Rx     - COVID19 BinaxNOW Antigen  - Rapid Strep    2. Acute maxillary sinusitis, recurrence not specified  See above.  - COVID19 BinaxNOW Antigen  - Rapid Strep     The patient and provider have a longitudinal relationship to address/treat the serious or   complex condition(s) as stated in this encounter.     Meds This Visit:  Requested Prescriptions      No prescriptions requested or ordered in this encounter       Health Maintenance:  Health Maintenance   Topic Date Due    COVID-19 Vaccine (4 - 2024-25 season) 09/01/2024    Meningococcal B Vaccine (1 of 2 - Standard) 01/24/2026 (Originally 9/11/2021)    Influenza Vaccine (1) 10/01/2025    DTaP,Tdap,and Td Vaccines (7 - Td or Tdap) 12/25/2025    Annual Physical  01/24/2026    Annual Depression Screening  Completed    Hepatitis B Vaccines  Completed    Hepatitis A Vaccines  Completed    MMR Vaccines  Completed    Varicella Vaccines  Completed    Meningococcal Vaccine  Completed    HPV Vaccines  Completed    Pneumococcal Vaccine: Birth to  50yrs  Aged Out         Patient/Caregiver Education: There are no barriers to learning. Medical education done.   Outcome: Patient verbalizes understanding and agrees with plan. Advised to call or RTC if symptoms persist or worsen.    Problem List:   Problem List[7]    Imaging & Referrals:  None     7/16/2025  Karina Adan MD      Patient understands plan and follow-up.  Return if symptoms worsen or fail to improve.              [1]   Past Medical History:   On Accutane therapy   [2] No past surgical history on file.  [3]   Family History  Problem Relation Age of Onset    No Known Problems Mother     No Known Problems Father     No Known Problems Sister     Other (autism) Brother     High Cholesterol Maternal Grandmother     Hypertension Maternal Grandfather     Heart Attack Maternal Grandfather 65    Other (lung cancer age 57, smoker) Paternal Grandmother     Cancer Paternal Grandfather         esophageal    Diabetes Paternal Grandfather    [4]   Social History  Socioeconomic History    Marital status: Single   Tobacco Use    Smoking status: Never     Passive exposure: Never    Smokeless tobacco: Never   Vaping Use    Vaping status: Never Used   Substance and Sexual Activity    Alcohol use: Never    Drug use: Never    Sexual activity: Not Currently   Other Topics Concern    Caffeine Concern No    Exercise No    Seat Belt No    Special Diet No    Stress Concern No    Weight Concern No     Social Drivers of Health     Food Insecurity: No Food Insecurity (1/24/2025)    NCSS - Food Insecurity     Worried About Running Out of Food in the Last Year: No     Ran Out of Food in the Last Year: No   Transportation Needs: No Transportation Needs (1/24/2025)    NCSS - Transportation     Lack of Transportation: No   Housing Stability: Not At Risk (1/24/2025)    NCSS - Housing/Utilities     Has Housing: Yes     Worried About Losing Housing: No     Unable to Get Utilities: No   [5]   Current Outpatient Medications    Medication Sig Dispense Refill    MEDROXYPROGESTERONE ACETATE 10 MG Oral Tab TAKE 1 TABLET BY MOUTH DAILY FOR 7 DAYS EVERY MONTH 21 tablet 0    fluconazole 150 MG Oral Tab TAKE 1 TABLET BY MOUTH 1 TIME FOR 1 DOSE. IF NO IMPROVEMENT IN 72 HOURS TAKE 2 ND DOSE      sulfamethoxazole-trimethoprim -160 MG Oral Tab per tablet Take 1 tablet by mouth 2 (two) times daily.      polyethylene glycol, PEG 3350, 17 GM/SCOOP Oral Powder Take 17 g by mouth daily. 238 g 5    metoprolol succinate ER 25 MG Oral Tablet 24 Hr Take 1 tablet (25 mg total) by mouth daily. (Patient not taking: Reported on 4/15/2025) 90 tablet 1    amitriptyline 10 MG Oral Tab Take by mouth.      Iron Carbonyl-Vitamin C-FOS (CHEWABLE IRON) 30-10-25 MG Oral Chew Tab Chew by mouth. iron gummies 2 by mouth      mupirocin 2 % External Ointment Apply 1 Application topically 3 (three) times daily. (Patient not taking: Reported on 4/15/2025) 30 g 0    Fiber Adult Gummies 2 g Oral Chew Tab Chew 2 tablets by mouth at bedtime. Buy over-the-counter. 180 tablet 3    docusate sodium 100 MG Oral Tab Take 1 tablet (100 mg total) by mouth 2 (two) times daily as needed for constipation. BUY OTC if not covered- compare to \" Colace\" name brand at local pharmacy 60 tablet 0    Omeprazole 40 MG Oral Capsule Delayed Release TAKE 1 CAPSULE BY MOUTH DAILY 30 MINUTES BEFORE FOOD OR OTHER MEDS     [6]   Allergies  Allergen Reactions    Doxycycline OTHER (SEE COMMENTS)     patient does not was this medication do to expressing headaches and abdomen pain    Metronidazole OTHER (SEE COMMENTS)     patient does not was this medication do to expressing headaches and abdomen pain    Penicillin G RASH   [7]   Patient Active Problem List  Diagnosis    Dysmenorrhea in adolescent    Anxiety    Reading impairment    Iron deficiency anemia due to chronic blood loss    Gastritis    History of PID    CANDIDO (generalized anxiety disorder)    Chronic idiopathic constipation    Low TSH level     Subclinical hyperthyroidism    Multinodular goiter    Late menses    Functional gastrointestinal symptoms    Amenorrhea    Facial lesion    Hyperprolactinemia (HCC)    Hemangioma of skin    Hair loss    Pharyngitis    PCOS (polycystic ovarian syndrome)

## 2025-07-31 ENCOUNTER — TELEPHONE (OUTPATIENT)
Dept: FAMILY MEDICINE CLINIC | Facility: CLINIC | Age: 20
End: 2025-07-31

## 2025-08-05 ENCOUNTER — OFFICE VISIT (OUTPATIENT)
Dept: OBGYN CLINIC | Facility: CLINIC | Age: 20
End: 2025-08-05

## 2025-08-05 VITALS
SYSTOLIC BLOOD PRESSURE: 108 MMHG | WEIGHT: 125 LBS | DIASTOLIC BLOOD PRESSURE: 66 MMHG | BODY MASS INDEX: 23.6 KG/M2 | HEART RATE: 92 BPM | HEIGHT: 61 IN

## 2025-08-05 DIAGNOSIS — N89.8 VAGINAL IRRITATION: Primary | ICD-10-CM

## 2025-08-05 DIAGNOSIS — Z30.09 ENCOUNTER FOR COUNSELING REGARDING CONTRACEPTION: ICD-10-CM

## 2025-08-05 DIAGNOSIS — Z11.3 SCREEN FOR STD (SEXUALLY TRANSMITTED DISEASE): ICD-10-CM

## 2025-08-05 DIAGNOSIS — Z72.51 HIGH RISK HETEROSEXUAL BEHAVIOR: ICD-10-CM

## 2025-08-05 PROCEDURE — 87491 CHLMYD TRACH DNA AMP PROBE: CPT | Performed by: NURSE PRACTITIONER

## 2025-08-05 PROCEDURE — 87591 N.GONORRHOEAE DNA AMP PROB: CPT | Performed by: NURSE PRACTITIONER

## 2025-08-05 PROCEDURE — 99214 OFFICE O/P EST MOD 30 MIN: CPT | Performed by: NURSE PRACTITIONER

## 2025-08-05 PROCEDURE — 81514 NFCT DS BV&VAGINITIS DNA ALG: CPT | Performed by: NURSE PRACTITIONER

## 2025-08-05 PROCEDURE — 99459 PELVIC EXAMINATION: CPT | Performed by: NURSE PRACTITIONER

## 2025-08-05 RX ORDER — OMEPRAZOLE 20 MG/1
20 CAPSULE, DELAYED RELEASE ORAL DAILY
COMMUNITY
Start: 2025-07-03

## 2025-08-06 LAB
BV BACTERIA DNA VAG QL NAA+PROBE: NEGATIVE
C GLABRATA DNA VAG QL NAA+PROBE: NEGATIVE
C KRUSEI DNA VAG QL NAA+PROBE: NEGATIVE
C TRACH DNA SPEC QL NAA+PROBE: NEGATIVE
CANDIDA DNA VAG QL NAA+PROBE: NEGATIVE
N GONORRHOEA DNA SPEC QL NAA+PROBE: NEGATIVE
T VAGINALIS DNA VAG QL NAA+PROBE: NEGATIVE

## (undated) NOTE — LETTER
Date: 11/30/2023    Patient Name: Tracie Lara          To Whom it may concern: This letter has been written at the patient's request. The above patient was seen at the [facility] for treatment of a medical condition. This patient should be excused from attending gym class swimming unit from 11/13/23 through 11/17/23, as well as the future swimming unit next semester. Should you have any questions, please do not hesitate to contact my office.           Sincerely,      Mine Olivarez MD

## (undated) NOTE — LETTER
Date: 1/20/2020    Patient Name: Camron Ackerman          To Whom it may concern: This letter has been written at the patient's request. The above patient was seen at the [facility] for treatment of a medical condition.       The patient

## (undated) NOTE — LETTER
Date: 12/17/2019    Patient Name: Barbara Larios          To Whom it may concern: This letter has been written at the patient's request. The above patient was seen at the [facility] for treatment of a medical condition.     This patient

## (undated) NOTE — LETTER
Date: 11/8/2022    Patient Name: Jim Chester          To Whom it may concern: This letter has been written at the patient's request. The above patient was seen at the [facility] for treatment of a medical condition. This patient should be allowed to use bathroom due to medical conditions when needed or allow extra 2-3 minutes to use bathroom between classes and not consider her tarde for class.          Sincerely,      Val Durán, DO

## (undated) NOTE — LETTER
Date: 5/3/2019    Patient Name: Aguilar Wells          To Whom it may concern: This letter has been written at the patient's request. The above patient was seen at the [facility] for treatment of a medical condition.       The patient m

## (undated) NOTE — LETTER
Date: 6/22/2018    Patient Name: Artemio Kelly          To Whom it may concern: The above patient was seen at the [facility] for treatment of a medical condition. Please have this patient evaluated for Dyslexia.         Sincerely,

## (undated) NOTE — LETTER
Date: 8/26/2022    Patient Name: Paxton Shukla          To Whom it may concern: This letter has been written at the patient's request. The above patient was seen at the [facility] for treatment of a medical condition. This patient should be excused to use the bathroom due to a medical issue when needed. Patient is prone to bladder infections and if prolonged waiting, will have reoccurrence and will miss school again. Please excuse from school today, the entire day.             Sincerely,      Elenita Villalobos, DO

## (undated) NOTE — LETTER
Date: 8/15/2023    Patient Name: Elian Cabrera          To Whom it may concern: This letter has been written at the patient's request. The above patient was seen at the [facility] for treatment of a medical condition.       The patient may return to work/school on 08/16/2023        Sincerely,    Reyes Hope DO

## (undated) NOTE — LETTER
Date: 7/16/2025    Patient Name: Sandy Hare          To Whom it may concern:    This letter has been written at the patient's request. The above patient was seen at [facility] for treatment of a medical condition.    This patient should be excused from attending work from 7/16/25 through 7/20/2025.    The patient may return to work on 7/21/2025 with the following limitations : none. However, if symptoms resolve, she may return to work sooner.        Sincerely,      Karina Adan MD

## (undated) NOTE — LETTER
Date: 5/11/2018    Patient Name: Suzie Kim          To Whom it may concern: The above patient was seen at the [facility] for treatment of a medical condition.     Please allow this patient to use the restroom as needed due to a medi

## (undated) NOTE — LETTER
Date: 8/29/2019    Patient Name: Chris Holden          To Whom it may concern: This letter has been written at the patient's request. The above patient was seen at the [facility] for treatment of a medical condition.     The patient ma